# Patient Record
Sex: FEMALE | Race: WHITE | Employment: UNEMPLOYED | URBAN - METROPOLITAN AREA
[De-identification: names, ages, dates, MRNs, and addresses within clinical notes are randomized per-mention and may not be internally consistent; named-entity substitution may affect disease eponyms.]

---

## 2019-07-31 ENCOUNTER — OFFICE VISIT (OUTPATIENT)
Dept: HEMATOLOGY | Age: 54
End: 2019-07-31

## 2019-07-31 ENCOUNTER — HOSPITAL ENCOUNTER (OUTPATIENT)
Dept: LAB | Age: 54
Discharge: HOME | End: 2019-07-31
Payer: OTHER GOVERNMENT

## 2019-07-31 VITALS
OXYGEN SATURATION: 97 % | HEIGHT: 65 IN | TEMPERATURE: 98.5 F | WEIGHT: 150 LBS | DIASTOLIC BLOOD PRESSURE: 72 MMHG | HEART RATE: 80 BPM | BODY MASS INDEX: 24.99 KG/M2 | SYSTOLIC BLOOD PRESSURE: 126 MMHG

## 2019-07-31 DIAGNOSIS — R74.8 ELEVATED LIVER ENZYMES: ICD-10-CM

## 2019-07-31 DIAGNOSIS — R74.8 ELEVATED LIVER ENZYMES: Primary | ICD-10-CM

## 2019-07-31 LAB
ALBUMIN SERPL-MCNC: 3.7 G/DL (ref 3.4–5)
ALBUMIN/GLOB SERPL: 0.8 {RATIO} (ref 0.8–1.7)
ALP SERPL-CCNC: 349 U/L (ref 45–117)
ALT SERPL-CCNC: 115 U/L (ref 13–56)
ANION GAP SERPL CALC-SCNC: 8 MMOL/L (ref 3–18)
AST SERPL-CCNC: 73 U/L (ref 10–38)
BASOPHILS # BLD: 0 K/UL (ref 0–0.1)
BASOPHILS NFR BLD: 0 % (ref 0–2)
BILIRUB DIRECT SERPL-MCNC: <0.1 MG/DL (ref 0–0.2)
BILIRUB SERPL-MCNC: 0.2 MG/DL (ref 0.2–1)
BUN SERPL-MCNC: 10 MG/DL (ref 7–18)
BUN/CREAT SERPL: 19 (ref 12–20)
CALCIUM SERPL-MCNC: 9.4 MG/DL (ref 8.5–10.1)
CHLORIDE SERPL-SCNC: 108 MMOL/L (ref 100–111)
CO2 SERPL-SCNC: 25 MMOL/L (ref 21–32)
CREAT SERPL-MCNC: 0.54 MG/DL (ref 0.6–1.3)
DIFFERENTIAL METHOD BLD: ABNORMAL
EOSINOPHIL # BLD: 0.1 K/UL (ref 0–0.4)
EOSINOPHIL NFR BLD: 1 % (ref 0–5)
ERYTHROCYTE [DISTWIDTH] IN BLOOD BY AUTOMATED COUNT: 14.1 % (ref 11.6–14.5)
GLOBULIN SER CALC-MCNC: 4.4 G/DL (ref 2–4)
GLUCOSE SERPL-MCNC: 85 MG/DL (ref 74–99)
HCT VFR BLD AUTO: 34.4 % (ref 35–45)
HGB BLD-MCNC: 11.1 G/DL (ref 12–16)
INR PPP: 0.9 (ref 0.8–1.2)
LYMPHOCYTES # BLD: 1.9 K/UL (ref 0.9–3.6)
LYMPHOCYTES NFR BLD: 34 % (ref 21–52)
MCH RBC QN AUTO: 29.9 PG (ref 24–34)
MCHC RBC AUTO-ENTMCNC: 32.3 G/DL (ref 31–37)
MCV RBC AUTO: 92.7 FL (ref 74–97)
MONOCYTES # BLD: 0.8 K/UL (ref 0.05–1.2)
MONOCYTES NFR BLD: 14 % (ref 3–10)
NEUTS SEG # BLD: 2.8 K/UL (ref 1.8–8)
NEUTS SEG NFR BLD: 51 % (ref 40–73)
PLATELET # BLD AUTO: 344 K/UL (ref 135–420)
PMV BLD AUTO: 11 FL (ref 9.2–11.8)
POTASSIUM SERPL-SCNC: 4.2 MMOL/L (ref 3.5–5.5)
PROT SERPL-MCNC: 8.1 G/DL (ref 6.4–8.2)
PROTHROMBIN TIME: 11.8 SEC (ref 11.5–15.2)
RBC # BLD AUTO: 3.71 M/UL (ref 4.2–5.3)
SODIUM SERPL-SCNC: 141 MMOL/L (ref 136–145)
WBC # BLD AUTO: 5.5 K/UL (ref 4.6–13.2)

## 2019-07-31 PROCEDURE — 86038 ANTINUCLEAR ANTIBODIES: CPT

## 2019-07-31 PROCEDURE — 80076 HEPATIC FUNCTION PANEL: CPT

## 2019-07-31 PROCEDURE — 83516 IMMUNOASSAY NONANTIBODY: CPT

## 2019-07-31 PROCEDURE — 85025 COMPLETE CBC W/AUTO DIFF WBC: CPT

## 2019-07-31 PROCEDURE — 85610 PROTHROMBIN TIME: CPT

## 2019-07-31 PROCEDURE — 36415 COLL VENOUS BLD VENIPUNCTURE: CPT

## 2019-07-31 PROCEDURE — 86708 HEPATITIS A ANTIBODY: CPT

## 2019-07-31 PROCEDURE — 87340 HEPATITIS B SURFACE AG IA: CPT

## 2019-07-31 PROCEDURE — 86704 HEP B CORE ANTIBODY TOTAL: CPT

## 2019-07-31 PROCEDURE — 82103 ALPHA-1-ANTITRYPSIN TOTAL: CPT

## 2019-07-31 PROCEDURE — 82728 ASSAY OF FERRITIN: CPT

## 2019-07-31 PROCEDURE — 83540 ASSAY OF IRON: CPT

## 2019-07-31 PROCEDURE — 86706 HEP B SURFACE ANTIBODY: CPT

## 2019-07-31 PROCEDURE — 86256 FLUORESCENT ANTIBODY TITER: CPT

## 2019-07-31 PROCEDURE — 86803 HEPATITIS C AB TEST: CPT

## 2019-07-31 PROCEDURE — 80048 BASIC METABOLIC PNL TOTAL CA: CPT

## 2019-07-31 NOTE — Clinical Note
7/31/19 Patient: Jennyfer Mckeon YOB: 1965 Date of Visit: 7/31/2019 PATEL Tellez 
[facility] [address] [address] VIA Facsimile: [phone] Dear PATEL Tellez, Thank you for referring Ms. Jennyfer Mckeon to [address] [place] [address],[address] for evaluation. My notes for this consultation are attached. If you have questions, please do not hesitate to call me. I look forward to following your patient along with you. Sincerely, Joshua Quiñones MD

## 2019-07-31 NOTE — PROGRESS NOTES
[address] [facility], Jose GOODMAN Lezlie Ganja, MD Elie Hammond, PA-C Charissa Mayor, ACN-BC     April S Lion, [facility]   MARIA DEL CARMEN JuradoP-MARCUS Pittman, [facility]       Sharon Mcclelland [facility] [place] [address]    at [address] [place], [address] [facility]    [address] [facility] 22.    [phone]    FAX: [address] [place] [address], [address]    [phone]    FAX: [phone]       Patient Care Team:  Judit Lazaro as PCP - General (Physician Assistant)      Problem List  Date Reviewed: 7/31/2019          Codes Class Noted    Elevated liver enzymes ICD-10-CM: R74.8  ICD-9-CM: 790.5  7/31/2019              The clinicians listed above have asked me to see Chitra Holliday in consultation regarding elevated liver enzymes and its management. All medical records sent by the referring physicians were reviewed including imaging studies     The patient is a 48 y.o.  female who was found to have elevated liver transaminases and alkaline phosphatase in 2009. Serologic evaluation for markers of chronic liver disease has either not been performed or the results are not available to me. Ultrasound MRI of the liver was performed in 3/2019. The results of the imaging suggested chronic liver disease. An assessment of liver fibrosis with biopsy or elastography has not been performed. The patient has the following symptoms which are thought to be due to the liver disease:  fatigue,     The patient has not experienced the following symptoms:  pain in the right side over the liver,     The patient completes all daily activities without any functional limitations.       ASSESSMENT AND PLAN:  Elevated liver enzymes  Persistent elevation in liver transaminases and alkaline phosphatase of unclear etiology at this time. Liver function is normal.      Based upon laboratory studies and imaging  the patient may have advanced liver disease or cirrhosis. Serologic testing for causes of chronic liver disease were ordered. The most likely causes for the liver chemistry abnormalities were discussed with the patient and include   immune liver disorders,     Have performed laboratory testing to monitor liver function and degree of liver injury. This included BMP, hepatic panel, CBC with platelet count, INR. The need to assess liver fibrosis was discussed. Sheer wave elastography can assess liver fibrosis and determine if a patient has advanced fibrosis or cirrhosis without the need for liver biopsy. This will be scheduled. If the elastography suggests advanced fibrosis then a liver biopsy should be considered. Screening for Hepatocellular Carcinoma  HCC screening has recently been performed and does not suggest [address] The next liver imaging study will be performed in 9/2019. Treatment of other medical problems in patients with chronic liver disease  There are no contraindications for the patient to take most medications that are necessary for treatment of other medical issues. Counseling for alcohol in patients with chronic liver disease  The patient was counseled regarding alcohol consumption and the effect of alcohol on chronic liver disease. The patient does not consume any significant amount of alcohol. Vaccinations   The need for vaccination against viral hepatitis A and B will be assessed with serologic and instituted as appropriate. Routine vaccinations against other bacterial and viral agents can be performed as indicated. Annual flu vaccination should be administered if indicated.       ALLERGIES  Allergies   Allergen Reactions    Sulfa (Sulfonamide Antibiotics) Rash       MEDICATIONS  No current outpatient medications on file. No current facility-administered medications for this visit. SYSTEM REVIEW NOT RELATED TO LIVER DISEASE OR REVIEWED ABOVE:  Constitution systems: Negative for fever, chills, weight gain, weight loss. Eyes: Negative for visual changes. ENT: Negative for sore throat, painful swallowing. Respiratory: Negative for cough, hemoptysis, SOB. Cardiology: Negative for chest pain, palpitations. GI:  Negative for constipation or diarrhea. : Negative for urinary frequency, dysuria, hematuria, nocturia. Skin: Negative for rash. Hematology: Negative for easy bruising, blood clots. Musculo-skelatal: Negative for back pain, muscle pain, weakness. Neurologic: Negative for headaches, dizziness, vertigo, memory problems not related to HE. Psychology: Negative for anxiety, depression. FAMILY HISTORY:  The father   of unknown causes. The mother  from [facility]. SOCIAL HISTORY:  The patient is . The patient has 3 children, and 6 grandchildren. The patient has never used tobacco products. The patient has never consumed significant amounts of alcohol. The patient does not work outside the home. PHYSICAL EXAMINATION:  Visit Vitals  /72   Pulse 80   Temp 98.5 °F (36.9 °C) (Tympanic)   Ht 5' 5\" (1.651 m)   Wt 150 lb (68 kg)   SpO2 97%   BMI 24.96 kg/m²     General: No acute distress. Eyes: Sclera anicteric. ENT: No oral lesions. Thyroid normal.  Nodes: No adenopathy. Skin: Single spider angiomata on chest.    Respiratory: Lungs clear to auscultation. Cardiovascular: Regular heart rate. No murmurs. No JVD. Abdomen: Soft non-tender. Liver size normal to percussion/palpation. Spleen not palpable. No obvious ascites. Extremities: No edema. No muscle wasting. No gross arthritic changes. Neurologic: Alert and oriented. Cranial nerves grossly intact. No asterixis.     LABORATORY STUDIES:  From 2019  AST/ALT/ALP/T Bili/ALB:  98/118/280/0.2/4.2  BUN/CREAT:  11/0.47    SEROLOGIES:  Not available or performed. Testing was performed today. LIVER HISTOLOGY:  Not available or performed    ENDOSCOPIC PROCEDURES:  Not available or performed    RADIOLOGY:  2/2019. Ultrasound of liver. Echogenic consistent with cirrhosis. No liver mass lesions. No dilated bile ducts. No ascites. 3/2019. MRI scan abdomen. Changes consistent with cirrhosis. No liver mass lesions. No dilated bile ducts. No bile duct strictures. No ascites. OTHER TESTING:  Not available or performed    FOLLOW-UP:  All of the issues listed above in the Assessment and Plan were discussed with the patient. All questions were answered. The patient expressed a clear understanding of the above. [address] [address] in 4 weeks for elastography to review all data and determine the treatment plan.     Kelsey English MD  [address]  [address] [place] [address], [address] [place] [address] - [address]  [address] [place] [address]

## 2019-08-01 LAB
A1AT SERPL-MCNC: 125 MG/DL (ref 90–200)
C-ANCA TITR SER IF: NORMAL TITER
COMMENT, 144067: NORMAL
FERRITIN SERPL-MCNC: 23 NG/ML (ref 8–388)
HAV AB SER QL IA: NEGATIVE
HBV CORE AB SERPL QL IA: NEGATIVE
HBV SURFACE AB SER QL IA: POSITIVE
HBV SURFACE AB SERPL IA-ACNC: 21.14 MIU/ML
HBV SURFACE AG SER QL: <0.1 INDEX
HBV SURFACE AG SER QL: NEGATIVE
HCV AB S/CO SERPL IA: <0.1 S/CO RATIO (ref 0–0.9)
HEP BS AB COMMENT,HBSAC: NORMAL
IRON SATN MFR SERPL: 14 %
IRON SERPL-MCNC: 72 UG/DL (ref 50–175)
P-ANCA ATYPICAL TITR SER IF: NORMAL TITER
P-ANCA TITR SER IF: NORMAL TITER
TIBC SERPL-MCNC: 500 UG/DL (ref 250–450)

## 2019-08-02 LAB
ACTIN IGG SERPL-ACNC: 15 UNITS (ref 0–19)
ANA TITR SER IF: NEGATIVE {TITER}
MITOCHONDRIA M2 IGG SER-ACNC: 170.7 UNITS (ref 0–20)

## 2019-08-08 ENCOUNTER — HOSPITAL ENCOUNTER (OUTPATIENT)
Dept: ULTRASOUND IMAGING | Age: 54
Discharge: HOME | End: 2019-08-08
Attending: INTERNAL MEDICINE
Payer: OTHER GOVERNMENT

## 2019-08-08 DIAGNOSIS — R74.8 ELEVATED LIVER ENZYMES: ICD-10-CM

## 2019-08-08 PROCEDURE — 76981 USE PARENCHYMA: CPT

## 2019-08-14 ENCOUNTER — OFFICE VISIT (OUTPATIENT)
Dept: HEMATOLOGY | Age: 54
End: 2019-08-14

## 2019-08-14 VITALS
RESPIRATION RATE: 18 BRPM | OXYGEN SATURATION: 98 % | DIASTOLIC BLOOD PRESSURE: 83 MMHG | WEIGHT: 151 LBS | SYSTOLIC BLOOD PRESSURE: 113 MMHG | BODY MASS INDEX: 25.16 KG/M2 | TEMPERATURE: 98.6 F | HEIGHT: 65 IN | HEART RATE: 71 BPM

## 2019-08-14 DIAGNOSIS — K74.3 PRIMARY BILIARY CHOLANGITIS (HCC): Primary | ICD-10-CM

## 2019-08-14 RX ORDER — URSODIOL 500 MG/1
500 TABLET, FILM COATED ORAL 2 TIMES DAILY
Qty: 180 TAB | Refills: 4 | Status: SHIPPED | OUTPATIENT
Start: 2019-08-14 | End: 2020-05-07 | Stop reason: SDUPTHER

## 2019-08-14 NOTE — LETTER
8/14/19 Patient: Waqar Ruelas YOB: 1965 Date of Visit: 8/14/2019 PATEL James 
[facility] [address] [address] VIA Facsimile: [phone] Dear PATEL James, Thank you for referring Ms. Waqar Ruelas to [address] for evaluation. My notes for this consultation are attached. If you have questions, please do not hesitate to call me. I look forward to following your patient along with you. Sincerely, Laurel Rea MD

## 2019-08-14 NOTE — PROGRESS NOTES
Amanda Barber is a 48 y.o. female      1. Have you been to the ER, urgent care clinic or hospitalized since your last visit? NO.     2. Have you seen or consulted any other health care providers outside of the [address] [place] [address] since your last visit (Include any pap smears or colon screening)?  NO            Learning Assessment 8/14/2019   PRIMARY LEARNER Patient   BARRIERS PRIMARY LEARNER NONE   CO-LEARNER CAREGIVER No   PRIMARY LANGUAGE ENGLISH   LEARNER PREFERENCE PRIMARY LISTENING   ANSWERED BY PATIENT   RELATIONSHIP SELF

## 2019-08-14 NOTE — PROGRESS NOTES
[address] [facility], MD, [address], Cite Bright Pelayo, MD Bello Pradhan PA-C Hildreth Elder, [facility]-BC     [place] VARGHESE Lion [facility]   HAYLEE Melo, [facility]       [facility] [place] [address]    at [address], [address] Lynnette Almanza  22.    [phone]    FAX: [address] [place] [address], [address]    [phone]    FAX: [phone]       Patient Care Team:  Merlyn Hall as PCP - General (Physician Assistant)      Problem List  Date Reviewed: 7/31/2019          Codes Class Noted    Elevated liver enzymes ICD-10-CM: R74.8  ICD-9-CM: 790.5  7/31/2019              Gama Berry returns to the [address] for management of Primary Biliary Cholangitis. The active problem list, all pertinent past medical history, medications, radiologic findings and laboratory findings related to the liver disorder were reviewed with the patient. The patient is a 48 y.o.  female who was found to have elevated liver transaminases and alkaline phosphatase in 2009. Serologic evaluation for markers of chronic liver disease was positive for AMA. Ultrasound and MRI of the liver was performed in 3/2019. The results of the imaging suggested chronic liver disease and possible cirrhosis. Assessment of liver fibrosis was performed with sheer wave elastogrphy at [facility].  in 8/2019. The result was not calculated in kPa and suggested stage 2-3 fibrosis.     The patient has the following symptoms which are thought to be due to the liver disease:  fatigue,     The patient has not experienced the following symptoms:  pain in the right side over the liver,     The patient completes all daily activities without any functional limitations. ASSESSMENT AND PLAN:  Primary Biliary Cholangitis  The diagnosis is based upon serology and an elevation in ALP. A liver biopsy has not been performed. Assessment of liver fibrosis was performed with sheer wave elastogrphy in 8/2019. The result was not calculated in kPa which correlates with stage 2-3 fibrosis    Liver transaminases are elevated. ALP is elevated. Liver function is normal.  The platelet count is normal.    Based upon laboratory studies Elastography, and imaging the patient may have advanced liver disease. The patient will be started on DEANNA at a dose of 1000 mg every day. The side effects of DEANNA including a 2% risk of itching and a 2% risk of diarrhea were discussed. The need to perform an assessment of liver fibrosis was discussed with the patient. The Fibroscan can assess liver fibrosis and determine if a patient has advanced fibrosis or cirrhosis without the need for liver biopsy. This will be performed at the next office visit. If the Fibroscan suggests advanced fibrosis then a liver biopsy should be considered. Treatment of other medical problems in patients with chronic liver disease  There are no contraindications for the patient to take most medications that are necessary for treatment of other medical issues. Counseling for alcohol in patients with chronic liver disease  The patient was counseled regarding alcohol consumption and the effect of alcohol on chronic liver disease. The patient does not consume any significant amount of alcohol. Vaccinations   The need for vaccination against viral hepatitis A and B will be assessed with serologic and instituted as appropriate. Routine vaccinations against other bacterial and viral agents can be performed as indicated. Annual flu vaccination should be administered if indicated.       ALLERGIES  Allergies   Allergen Reactions    Sulfa (Sulfonamide Antibiotics) Rash       MEDICATIONS  No current outpatient medications on file. No current facility-administered medications for this visit. SYSTEM REVIEW NOT RELATED TO LIVER DISEASE OR REVIEWED ABOVE:  Constitution systems: Negative for fever, chills, weight gain, weight loss. Eyes: Negative for visual changes. ENT: Negative for sore throat, painful swallowing. Respiratory: Negative for cough, hemoptysis, SOB. Cardiology: Negative for chest pain, palpitations. GI:  Negative for constipation or diarrhea. : Negative for urinary frequency, dysuria, hematuria, nocturia. Skin: Negative for rash. Hematology: Negative for easy bruising, blood clots. Musculo-skelatal: Negative for back pain, muscle pain, weakness. Neurologic: Negative for headaches, dizziness, vertigo, memory problems not related to HE. Psychology: Negative for anxiety, depression. FAMILY HISTORY:  The father   of unknown causes. The mother  from [facility]. SOCIAL HISTORY:  The patient is . The patient has 3 children, and 6 grandchildren. The patient has never used tobacco products. The patient has never consumed significant amounts of alcohol. The patient does not work outside the home. PHYSICAL EXAMINATION:  Visit Vitals  /83 (BP 1 Location: Right arm, BP Patient Position: Sitting)   Pulse 71   Temp 98.6 °F (37 °C) (Tympanic)   Resp 18   Ht 5' 5\" (1.651 m)   Wt 151 lb (68.5 kg)   SpO2 98%   BMI 25.13 kg/m²     General: No acute distress. Eyes: Sclera anicteric. ENT: No oral lesions. Thyroid normal.  Nodes: No adenopathy. Skin: Single spider angiomata on chest.    Respiratory: Lungs clear to auscultation. Cardiovascular: Regular heart rate. No murmurs. No JVD. Abdomen: Soft non-tender. Liver size normal to percussion/palpation. Spleen not palpable. No obvious ascites. Extremities: No edema. No muscle wasting.   No gross arthritic changes. Neurologic: Alert and oriented. Cranial nerves grossly intact. No asterixis. LABORATORY STUDIES:  Liver [place] [address] Units 7/31/2019   WBC 4.6 - 13.2 K/uL 5.5   ANC 1.8 - 8.0 K/UL 2.8   HGB 12.0 - 16.0 g/dL 11.1 (L)    - 420 K/uL 344   INR 0.8 - 1.2   0.9   AST 10 - 38 U/L 73 (H)   ALT 13 - 56 U/L 115 (H)   Alk Phos 45 - 117 U/L 349 (H)   Bili, Total 0.2 - 1.0 MG/DL 0.2   Bili, Direct 0.0 - 0.2 MG/DL <0.1   Albumin 3.4 - 5.0 g/dL 3.7   BUN 7.0 - 18 MG/DL 10   Creat 0.6 - 1.3 MG/DL 0.54 (L)   Na 136 - 145 mmol/L 141   K 3.5 - 5.5 mmol/L 4.2   Cl 100 - 111 mmol/L 108   CO2 21 - 32 mmol/L 25   Glucose 74 - 99 mg/dL 85     SEROLOGIES:  Serologies Latest Ref Rng & Units 7/31/2019   Hep A Ab, Total NEGATIVE   NEGATIVE   Hep B Surface Ag <1.00 Index <0.10   Hep B Surface Ag Interp NEG   NEGATIVE   Hep B Core Ab, Total NEGATIVE   NEGATIVE   Hep B Surface Ab >10.0 mIU/mL 21.14   Hep B Surface Ab Interp POS   POSITIVE   Hep C Ab 0.0 - 0.9 s/co ratio <0.1   Ferritin 8 - 388 NG/ML 23   Iron % Saturation % 14   JOSAFAT, IFA  NEGATIVE   C-ANCA Neg:<1:20 titer <1:20   P-ANCA Neg:<1:20 titer <1:20   ANCA Neg:<1:20 titer <1:20   ASMCA 0 - 19 Units 15   M2 Ab 0.0 - 20.0 Units 170.7 (H)   Alpha-1 antitrypsin level 90 - 200 mg/dL 125     LIVER HISTOLOGY:  8/2019. Sheer wave elastography performed at [facility].  1.7 m/sec. The results suggested a fibrosis level of F2-3. ENDOSCOPIC PROCEDURES:  Not available or performed    RADIOLOGY:  2/2019. Ultrasound of liver. Echogenic consistent with cirrhosis. No liver mass lesions. No dilated bile ducts. No ascites. 3/2019. MRI scan abdomen. Changes consistent with cirrhosis. No liver mass lesions. No dilated bile ducts. No bile duct strictures. No ascites. OTHER TESTING:  Not available or performed    FOLLOW-UP:  All of the issues listed above in the Assessment and Plan were discussed with the patient.   All questions were answered. The patient expressed a clear understanding of the above. [facility] 87 in 4 weeks for Fibroscan to assess response to DEANNA.         Karla Moran MD  [address]  4 [facility], [address] [place] [address] Amanda Leahy, [address]  12 [facility]

## 2019-09-10 ENCOUNTER — OFFICE VISIT (OUTPATIENT)
Dept: HEMATOLOGY | Age: 54
End: 2019-09-10

## 2019-09-10 ENCOUNTER — HOSPITAL ENCOUNTER (OUTPATIENT)
Dept: LAB | Age: 54
Discharge: HOME | End: 2019-09-10
Payer: OTHER GOVERNMENT

## 2019-09-10 VITALS
HEART RATE: 67 BPM | DIASTOLIC BLOOD PRESSURE: 73 MMHG | SYSTOLIC BLOOD PRESSURE: 109 MMHG | HEIGHT: 65 IN | WEIGHT: 150 LBS | BODY MASS INDEX: 24.99 KG/M2 | RESPIRATION RATE: 16 BRPM | TEMPERATURE: 97.7 F | OXYGEN SATURATION: 96 %

## 2019-09-10 DIAGNOSIS — K74.3 PRIMARY BILIARY CHOLANGITIS (HCC): ICD-10-CM

## 2019-09-10 DIAGNOSIS — K74.3 PRIMARY BILIARY CHOLANGITIS (HCC): Primary | ICD-10-CM

## 2019-09-10 LAB
ALBUMIN SERPL-MCNC: 3.8 G/DL (ref 3.4–5)
ALBUMIN/GLOB SERPL: 0.9 {RATIO} (ref 0.8–1.7)
ALP SERPL-CCNC: 243 U/L (ref 45–117)
ALT SERPL-CCNC: 74 U/L (ref 13–56)
ANION GAP SERPL CALC-SCNC: 8 MMOL/L (ref 3–18)
AST SERPL-CCNC: 43 U/L (ref 10–38)
BASOPHILS # BLD: 0 K/UL (ref 0–0.1)
BASOPHILS NFR BLD: 0 % (ref 0–2)
BILIRUB DIRECT SERPL-MCNC: <0.1 MG/DL (ref 0–0.2)
BILIRUB SERPL-MCNC: 0.3 MG/DL (ref 0.2–1)
BUN SERPL-MCNC: 12 MG/DL (ref 7–18)
BUN/CREAT SERPL: 24 (ref 12–20)
CALCIUM SERPL-MCNC: 9.7 MG/DL (ref 8.5–10.1)
CHLORIDE SERPL-SCNC: 105 MMOL/L (ref 100–111)
CO2 SERPL-SCNC: 27 MMOL/L (ref 21–32)
CREAT SERPL-MCNC: 0.51 MG/DL (ref 0.6–1.3)
DIFFERENTIAL METHOD BLD: ABNORMAL
EOSINOPHIL # BLD: 0 K/UL (ref 0–0.4)
EOSINOPHIL NFR BLD: 1 % (ref 0–5)
ERYTHROCYTE [DISTWIDTH] IN BLOOD BY AUTOMATED COUNT: 13.9 % (ref 11.6–14.5)
GLOBULIN SER CALC-MCNC: 4.2 G/DL (ref 2–4)
GLUCOSE SERPL-MCNC: 79 MG/DL (ref 74–99)
HCT VFR BLD AUTO: 34.6 % (ref 35–45)
HGB BLD-MCNC: 11.1 G/DL (ref 12–16)
LYMPHOCYTES # BLD: 2.4 K/UL (ref 0.9–3.6)
LYMPHOCYTES NFR BLD: 41 % (ref 21–52)
MCH RBC QN AUTO: 29.8 PG (ref 24–34)
MCHC RBC AUTO-ENTMCNC: 32.1 G/DL (ref 31–37)
MCV RBC AUTO: 93 FL (ref 74–97)
MONOCYTES # BLD: 0.7 K/UL (ref 0.05–1.2)
MONOCYTES NFR BLD: 13 % (ref 3–10)
NEUTS SEG # BLD: 2.6 K/UL (ref 1.8–8)
NEUTS SEG NFR BLD: 45 % (ref 40–73)
PLATELET # BLD AUTO: 311 K/UL (ref 135–420)
PMV BLD AUTO: 10.5 FL (ref 9.2–11.8)
POTASSIUM SERPL-SCNC: 4.3 MMOL/L (ref 3.5–5.5)
PROT SERPL-MCNC: 8 G/DL (ref 6.4–8.2)
RBC # BLD AUTO: 3.72 M/UL (ref 4.2–5.3)
SODIUM SERPL-SCNC: 140 MMOL/L (ref 136–145)
WBC # BLD AUTO: 5.7 K/UL (ref 4.6–13.2)

## 2019-09-10 PROCEDURE — 36415 COLL VENOUS BLD VENIPUNCTURE: CPT

## 2019-09-10 PROCEDURE — 80048 BASIC METABOLIC PNL TOTAL CA: CPT

## 2019-09-10 PROCEDURE — 80076 HEPATIC FUNCTION PANEL: CPT

## 2019-09-10 PROCEDURE — 85025 COMPLETE CBC W/AUTO DIFF WBC: CPT

## 2019-09-10 NOTE — PROGRESS NOTES
[address] [facility], Aamir GOODMAN, Skye Hirsch MD Gloriann Bolls, NIVIA Alcantar, [facility]     April HALIMA TorresLion, [facility]   Lalito Figueredoland, FNP-C    Lanettegary Kishan, [facility]       Sharon Jesusutado [address]al [address]    at [address], [facility] Lynnette Almanza  22.    [phone]    FAX: [address] [place] [address], [address]    [phone]    FAX: [phone]       Patient Care Team:  Robert Gonzalez as PCP - General (Physician Assistant)      Problem List  Date Reviewed: 7/31/2019          Codes Class Noted    Primary biliary cholangitis [facility]) ICD-10-CM: K74.3  ICD-9-CM: 571.6  9/11/2019        Elevated liver enzymes ICD-10-CM: R74.8  ICD-9-CM: 790.5  7/31/2019            Collette Fountain returns to the [address] for management of Primary Biliary Cholangitis. The active problem list, all pertinent past medical history, medications, liver histology, radiologic findings, and laboratory findings related to the liver disorder were reviewed with the patient. The patient is a 48 y.o.  female who was found to have elevated liver transaminases and alkaline phosphatase in 2009. Serologic evaluation for markers of chronic liver disease was positive for AMA. The most recent imaging of the liver was Ultrasound performed in 8/2019. Results suggest chronic liver disease. Contour is notable for slight lobulation but there is no definite nodularity. Assessment of liver fibrosis was performed with shear wave elastography at [facility] in 8/2019. The result was mean liver stiffness value = 1.70 (+/- 0.05) m/s which correlates with septal fibrosis.     The patient has the following symptoms which are thought to be due to the liver disease: fatigue    The patient has not experienced the following symptoms:  pain in the right side over the liver    The patient completes all daily activities without any functional limitations. ASSESSMENT AND PLAN:  Primary Biliary Cholangitis  The diagnosis is based upon serology and an elevation in ALP. A liver biopsy has not been performed. Assessment of liver fibrosis was performed with shear wave elastogrphy in 8/2019. The result was mean liver stiffness value = 1.70 (+/- 0.05) m/s which correlates with stage 2 septal fibrosis    Liver transaminases are elevated. ALP is elevated. Liver function is normal. The platelet count is normal.      Based upon laboratory studies, Elastography, and imaging  the patient may have advanced liver disease. The patient was started on DEANNA at a dose of 1000 mg every day in 8/2019. The side effects of DEANNA including a 2% risk of itching and a 2% risk of diarrhea were discussed. The patient is tolerating the treatment well without any side effects. The patient is responding to treatment. The ALP is improving. Clinical Trials  The patient is not interested in participating in clinical trials for medications. She is however willing to participate in any trials for screening of progression. Screening for Hepatocellular Carcinoma  HCC screening has recently been performed and does not suggest [facility] [address] The next liver imaging study will be performed in 2/2020. Treatment of other medical problems in patients with chronic liver disease  There are no contraindications for the patient to take most medications that are necessary for treatment of other medical issues. Counseling for alcohol in patients with chronic liver disease  The patient was counseled regarding alcohol consumption and the effect of alcohol on chronic liver disease. The patient does not consume any significant amount of alcohol.     Vaccinations   Vaccination for viral hepatitis A is recommended since the patient has no serologic evidence of previous exposure or vaccination with immunity. Vaccination for viral hepatitis B is not needed. The patient has serologic evidence of prior exposure or vaccination with immunity. Routine vaccinations against other bacterial and viral agents can be performed as indicated. Annual flu vaccination should be administered if indicated. ALLERGIES  Allergies   Allergen Reactions    Sulfa (Sulfonamide Antibiotics) Rash       MEDICATIONS  Current Outpatient Medications   Medication Sig    ursodiol (DEANNA FORTE) 500 mg tablet Take 1 Tab by mouth two (2) times a day. No current facility-administered medications for this visit. SYSTEM REVIEW NOT RELATED TO LIVER DISEASE OR REVIEWED ABOVE:  Constitution systems: Negative for fever, chills, weight gain, weight loss. Eyes: Negative for visual changes. ENT: Negative for sore throat, painful swallowing. Respiratory: Negative for cough, hemoptysis, SOB. Cardiology: Negative for chest pain, palpitations. GI:  Negative for constipation or diarrhea. : Negative for urinary frequency, dysuria, hematuria, nocturia. Skin: Negative for rash. Hematology: Negative for easy bruising, blood clots. Musculo-skelatal: Negative for back pain, muscle pain, weakness. Neurologic: Negative for headaches, dizziness, vertigo, memory problems not related to HE. Psychology: Negative for anxiety, depression. FAMILY HISTORY:  The father  of unknown causes. The mother  from [facility]. SOCIAL HISTORY:  The patient is . The patient has 3 children, and 6 grandchildren. The patient has never used tobacco products. The patient has never consumed significant amounts of alcohol. The patient does not work outside the home.         PHYSICAL EXAMINATION:  Visit Vitals  /73 (BP 1 Location: Right arm, BP Patient Position: Sitting)   Pulse 67   Temp 97.7 °F (36.5 °C) (Tympanic)   Resp 16   Ht 5' 5\" (1.651 m)   Wt 150 lb (68 kg)   SpO2 96%   BMI 24.96 kg/m²     General: No acute distress. Eyes: Sclera anicteric. ENT: No oral lesions. Thyroid normal.  Nodes: No adenopathy. Skin: Single spider angiomata on chest.    Respiratory: Lungs clear to auscultation. Cardiovascular: Regular heart rate. No murmurs. No JVD. Abdomen: Soft non-tender. Liver size normal to percussion/palpation. Spleen not palpable. No obvious ascites. Extremities: No edema. No muscle wasting. No gross arthritic changes. Neurologic: Alert and oriented. Cranial nerves grossly intact. No asterixis. LABORATORY STUDIES:  Liver [place] [address] Units 9/10/2019 7/31/2019   WBC 4.6 - 13.2 K/uL 5.7 5.5   ANC 1.8 - 8.0 K/UL 2.6 2.8   HGB 12.0 - 16.0 g/dL 11.1 (L) 11.1 (L)    - 420 K/uL 311 344   INR 0.8 - 1.2    0.9   AST 10 - 38 U/L 43 (H) 73 (H)   ALT 13 - 56 U/L 74 (H) 115 (H)   Alk Phos 45 - 117 U/L 243 (H) 349 (H)   Bili, Total 0.2 - 1.0 MG/DL 0.3 0.2   Bili, Direct 0.0 - 0.2 MG/DL <0.1 <0.1   Albumin 3.4 - 5.0 g/dL 3.8 3.7   BUN 7.0 - 18 MG/DL 12 10   Creat 0.6 - 1.3 MG/DL 0.51 (L) 0.54 (L)   Na 136 - 145 mmol/L 140 141   K 3.5 - 5.5 mmol/L 4.3 4.2   Cl 100 - 111 mmol/L 105 108   CO2 21 - 32 mmol/L 27 25   Glucose 74 - 99 mg/dL 79 85     SEROLOGIES:  Serologies Latest Ref Rng & Units 7/31/2019   Hep A Ab, Total NEGATIVE   NEGATIVE   Hep B Surface Ag <1.00 Index <0.10   Hep B Surface Ag Interp NEG   NEGATIVE   Hep B Core Ab, Total NEGATIVE   NEGATIVE   Hep B Surface Ab >10.0 mIU/mL 21.14   Hep B Surface Ab Interp POS   POSITIVE   Hep C Ab 0.0 - 0.9 s/co ratio <0.1   Ferritin 8 - 388 NG/ML 23   Iron % Saturation % 14   JOSAFAT, IFA  NEGATIVE   C-ANCA Neg:<1:20 titer <1:20   P-ANCA Neg:<1:20 titer <1:20   ANCA Neg:<1:20 titer <1:20   ASMCA 0 - 19 Units 15   M2 Ab 0.0 - 20.0 Units 170.7 (H)   Alpha-1 antitrypsin level 90 - 200 mg/dL 125     LIVER HISTOLOGY:  8/2019. Shear wave elastography performed at [facility]. Mean liver stiffness value = 1.70 (+/- 0.05) m/s. The results suggested a fibrosis level of F2.    ENDOSCOPIC PROCEDURES:  Not available or performed    RADIOLOGY:  2/2019. Ultrasound of liver. Echogenic consistent with cirrhosis. No liver mass lesions. No dilated bile ducts. No ascites. 3/2019. MRI scan abdomen. Changes consistent with cirrhosis. No liver mass lesions. No dilated bile ducts. No bile duct strictures. No ascites. 8/2019. Ultrasound of liver. Echogenic consistent with cirrhosis. No liver mass lesions. No dilated bile ducts. No ascites. OTHER TESTING:  Not available or performed    FOLLOW-UP:  All of the issues listed above in the Assessment and Plan were discussed with the patient. All questions were answered. The patient expressed a clear understanding of the above. [address] [address] in 3 months for routine monitoring.       Latisha Clubs, [facility] State HospitalNP-BC  [address] Liver [place] [address] [address]  [facility], [address] [place] [address] Street [address]  [phone]

## 2019-09-10 NOTE — PROGRESS NOTES
Brandon Motta is a 48 y.o. female      1. Have you been to the ER, urgent care clinic or hospitalized since your last visit? NO.     2. Have you seen or consulted any other health care providers outside of the [address] [place] [address] since your last visit (Include any pap smears or colon screening)?  NO          Learning Assessment 8/14/2019   PRIMARY LEARNER Patient   BARRIERS PRIMARY LEARNER NONE   CO-LEARNER CAREGIVER No   PRIMARY LANGUAGE ENGLISH   LEARNER PREFERENCE PRIMARY LISTENING   ANSWERED BY PATIENT   RELATIONSHIP SELF

## 2019-09-10 NOTE — PROGRESS NOTES
Liver enzymes still elevated however have improved. Alk phos down to 243 (was 349). Continue DEANNA and we will recheck at next visit.

## 2019-09-11 PROBLEM — K74.3 PRIMARY BILIARY CHOLANGITIS (HCC): Status: ACTIVE | Noted: 2019-09-11

## 2019-12-16 ENCOUNTER — OFFICE VISIT (OUTPATIENT)
Dept: HEMATOLOGY | Age: 54
End: 2019-12-16

## 2019-12-16 ENCOUNTER — HOSPITAL ENCOUNTER (OUTPATIENT)
Dept: LAB | Age: 54
Discharge: HOME | End: 2019-12-16
Payer: OTHER GOVERNMENT

## 2019-12-16 VITALS
TEMPERATURE: 96.9 F | DIASTOLIC BLOOD PRESSURE: 81 MMHG | HEIGHT: 65 IN | OXYGEN SATURATION: 98 % | WEIGHT: 148 LBS | BODY MASS INDEX: 24.66 KG/M2 | SYSTOLIC BLOOD PRESSURE: 133 MMHG | HEART RATE: 73 BPM

## 2019-12-16 DIAGNOSIS — K74.3 PRIMARY BILIARY CHOLANGITIS (HCC): Primary | ICD-10-CM

## 2019-12-16 DIAGNOSIS — K74.3 PRIMARY BILIARY CHOLANGITIS (HCC): ICD-10-CM

## 2019-12-16 LAB
ALBUMIN SERPL-MCNC: 3.7 G/DL (ref 3.4–5)
ALBUMIN/GLOB SERPL: 0.9 {RATIO} (ref 0.8–1.7)
ALP SERPL-CCNC: 205 U/L (ref 45–117)
ALT SERPL-CCNC: 53 U/L (ref 13–56)
ANION GAP SERPL CALC-SCNC: 5 MMOL/L (ref 3–18)
AST SERPL-CCNC: 37 U/L (ref 10–38)
BASOPHILS # BLD: 0 K/UL (ref 0–0.1)
BASOPHILS NFR BLD: 0 % (ref 0–2)
BILIRUB DIRECT SERPL-MCNC: <0.1 MG/DL (ref 0–0.2)
BILIRUB SERPL-MCNC: 0.2 MG/DL (ref 0.2–1)
BUN SERPL-MCNC: 9 MG/DL (ref 7–18)
BUN/CREAT SERPL: 18 (ref 12–20)
CALCIUM SERPL-MCNC: 9.3 MG/DL (ref 8.5–10.1)
CHLORIDE SERPL-SCNC: 105 MMOL/L (ref 100–111)
CO2 SERPL-SCNC: 29 MMOL/L (ref 21–32)
CREAT SERPL-MCNC: 0.5 MG/DL (ref 0.6–1.3)
DIFFERENTIAL METHOD BLD: ABNORMAL
EOSINOPHIL # BLD: 0 K/UL (ref 0–0.4)
EOSINOPHIL NFR BLD: 1 % (ref 0–5)
ERYTHROCYTE [DISTWIDTH] IN BLOOD BY AUTOMATED COUNT: 13.4 % (ref 11.6–14.5)
GLOBULIN SER CALC-MCNC: 3.9 G/DL (ref 2–4)
GLUCOSE SERPL-MCNC: 68 MG/DL (ref 74–99)
HCT VFR BLD AUTO: 35.2 % (ref 35–45)
HGB BLD-MCNC: 11.2 G/DL (ref 12–16)
LYMPHOCYTES # BLD: 1.9 K/UL (ref 0.9–3.6)
LYMPHOCYTES NFR BLD: 41 % (ref 21–52)
MCH RBC QN AUTO: 30.4 PG (ref 24–34)
MCHC RBC AUTO-ENTMCNC: 31.8 G/DL (ref 31–37)
MCV RBC AUTO: 95.4 FL (ref 74–97)
MONOCYTES # BLD: 0.6 K/UL (ref 0.05–1.2)
MONOCYTES NFR BLD: 13 % (ref 3–10)
NEUTS SEG # BLD: 2.1 K/UL (ref 1.8–8)
NEUTS SEG NFR BLD: 45 % (ref 40–73)
PLATELET # BLD AUTO: 338 K/UL (ref 135–420)
PMV BLD AUTO: 10.9 FL (ref 9.2–11.8)
POTASSIUM SERPL-SCNC: 3.8 MMOL/L (ref 3.5–5.5)
PROT SERPL-MCNC: 7.6 G/DL (ref 6.4–8.2)
RBC # BLD AUTO: 3.69 M/UL (ref 4.2–5.3)
SODIUM SERPL-SCNC: 139 MMOL/L (ref 136–145)
WBC # BLD AUTO: 4.6 K/UL (ref 4.6–13.2)

## 2019-12-16 PROCEDURE — 85025 COMPLETE CBC W/AUTO DIFF WBC: CPT

## 2019-12-16 PROCEDURE — 36415 COLL VENOUS BLD VENIPUNCTURE: CPT

## 2019-12-16 PROCEDURE — 80076 HEPATIC FUNCTION PANEL: CPT

## 2019-12-16 PROCEDURE — 80048 BASIC METABOLIC PNL TOTAL CA: CPT

## 2019-12-16 NOTE — PROGRESS NOTES
[address] [facility], MD, [address], [address] Bright Pelayo, MD Kendrick Valdez, NIVIA Dove, [facility]-BC     Quita Seymour, [facility]   Teodoro Cho P-C    Maira Anaya, [facility]       Sharonmihai Mcclelland Mikal [address]    at [address], [facility] Lynnette Almanza  22.    [phone]    FAX: [address] [place] [address], [address]    [phone]    FAX: [phone]       Patient Care Team:  Ivette Benavidez as PCP - General (Physician Assistant)  Ivette Benavidez (Physician Assistant)      Problem List  Date Reviewed: 12/16/2019          Codes Class Noted    Primary biliary cholangitis [facility]) ICD-10-CM: K74.3  ICD-9-CM: 571.6  9/11/2019              Le Jara returns to the [facility] for management of Primary Biliary Cholangitis. The active problem list, all pertinent past medical history, medications, liver histology, radiologic findings, and laboratory findings related to the liver disorder were reviewed with the patient. The patient is a 47 y.o.  female who was found to have elevated liver transaminases and alkaline phosphatase in 2009. Serologic evaluation for markers of chronic liver disease was positive for AMA. The most recent imaging of the liver was Ultrasound performed in 8/2019. Results suggest chronic liver disease. Contour is notable for slight lobulation but there is no definite nodularity. Assessment of liver fibrosis was performed with shear wave elastography at [facility] in 8/2019. The result was mean liver stiffness value = 1.70 (+/- 0.05) m/s which correlates with septal fibrosis. Fibroscan was performed at today's visit.  The result was E kPa of 10.9 which correlates with F2 septal fibrosis. The patient has the following symptoms which are thought to be due to the liver disease: fatigue    The patient has not experienced the following symptoms: pain in the right side over the liver, itching    The patient completes all daily activities without any functional limitations. ASSESSMENT AND PLAN:  Primary Biliary Cholangitis  The diagnosis is based upon serology and an elevation in ALP. A liver biopsy has not been performed. Assessment of liver fibrosis was performed with shear wave elastogrphy in 8/2019. The result was mean liver stiffness value = 1.70 (+/- 0.05) m/s which correlates with stage 2 septal fibrosis    Liver transaminases are elevated. ALP is elevated. Liver function is normal. The platelet count is normal.      Based upon laboratory studies, Elastography, and imaging  the patient does not appear to have advanced liver disease. The patient was started on DEANNA at a dose of 1000 mg every day in 8/2019. The side effects of DEANNA including a 2% risk of itching and a 2% risk of diarrhea were discussed. The patient is tolerating the treatment well without any side effects. The patient is responding to treatment. The ALP is improving. Clinical Trials  The patient is not interested in participating in clinical trials for medications. She is however willing to participate in any trials for screening of progression. Screening for Hepatocellular Carcinoma  HCC screening has recently been performed and does not suggest [address] The next liver imaging study will be performed in 2/2020. The data regarding the severity of fibrosis vs cirrhosis is conflicting. To be on the safe side will continue to screen for [address] every 6 months.     Treatment of other medical problems in patients with chronic liver disease  There are no contraindications for the patient to take most medications that are necessary for treatment of other medical issues. Counseling for alcohol in patients with chronic liver disease  The patient was counseled regarding alcohol consumption and the effect of alcohol on chronic liver disease. The patient does not consume any significant amount of alcohol. Vaccinations   Vaccination for viral hepatitis A is recommended since the patient has no serologic evidence of previous exposure or vaccination with immunity. Vaccination for viral hepatitis B is not needed. The patient has serologic evidence of prior exposure or vaccination with immunity. Routine vaccinations against other bacterial and viral agents can be performed as indicated. Annual flu vaccination should be administered if indicated. ALLERGIES  Allergies   Allergen Reactions    Sulfa (Sulfonamide Antibiotics) Rash       MEDICATIONS  Current Outpatient Medications   Medication Sig    ursodiol (DEANNA FORTE) 500 mg tablet Take 1 Tab by mouth two (2) times a day. No current facility-administered medications for this visit. SYSTEM REVIEW NOT RELATED TO LIVER DISEASE OR REVIEWED ABOVE:  Constitution systems: Negative for fever, chills, weight gain, weight loss. Eyes: Negative for visual changes. ENT: Negative for sore throat, painful swallowing. Respiratory: Negative for cough, hemoptysis, SOB. Cardiology: Negative for chest pain, palpitations. GI:  Negative for constipation or diarrhea. : Negative for urinary frequency, dysuria, hematuria, nocturia. Skin: Negative for rash. Hematology: Negative for easy bruising, blood clots. Musculo-skelatal: Negative for back pain, muscle pain, weakness. Neurologic: Negative for headaches, dizziness, vertigo, memory problems not related to HE. Psychology: Negative for anxiety, depression. FAMILY HISTORY:  The father  of unknown causes. The mother  from [facility]. SOCIAL HISTORY:  The patient is . The patient has 3 children, and 6 grandchildren.     The patient has never used tobacco products. The patient has never consumed significant amounts of alcohol. The patient does not work outside the home. PHYSICAL EXAMINATION:  Visit Vitals  /81   Pulse 73   Temp 96.9 °F (36.1 °C) (Tympanic)   Ht 5' 5\" (1.651 m)   Wt 148 lb (67.1 kg)   SpO2 98%   BMI 24.63 kg/m²     General: No acute distress. Eyes: Sclera anicteric. ENT: No oral lesions. Thyroid normal.  Nodes: No adenopathy. Skin: Single spider angiomata on chest.    Respiratory: Lungs clear to auscultation. Cardiovascular: Regular heart rate. No murmurs. No JVD. Abdomen: Soft non-tender. Liver size normal to percussion/palpation. Spleen not palpable. No obvious ascites. Extremities: No edema. No muscle wasting. No gross arthritic changes. Neurologic: Alert and oriented. Cranial nerves grossly intact. No asterixis.     LABORATORY STUDIES:  Liver [place] of [address] Units 9/10/2019 7/31/2019   WBC 4.6 - 13.2 K/uL 5.7 5.5   ANC 1.8 - 8.0 K/UL 2.6 2.8   HGB 12.0 - 16.0 g/dL 11.1 (L) 11.1 (L)    - 420 K/uL 311 344   INR 0.8 - 1.2    0.9   AST 10 - 38 U/L 43 (H) 73 (H)   ALT 13 - 56 U/L 74 (H) 115 (H)   Alk Phos 45 - 117 U/L 243 (H) 349 (H)   Bili, Total 0.2 - 1.0 MG/DL 0.3 0.2   Bili, Direct 0.0 - 0.2 MG/DL <0.1 <0.1   Albumin 3.4 - 5.0 g/dL 3.8 3.7   BUN 7.0 - 18 MG/DL 12 10   Creat 0.6 - 1.3 MG/DL 0.51 (L) 0.54 (L)   Na 136 - 145 mmol/L 140 141   K 3.5 - 5.5 mmol/L 4.3 4.2   Cl 100 - 111 mmol/L 105 108   CO2 21 - 32 mmol/L 27 25   Glucose 74 - 99 mg/dL 79 85     SEROLOGIES:  Serologies Latest Ref Rng & Units 7/31/2019   Hep A Ab, Total NEGATIVE   NEGATIVE   Hep B Surface Ag <1.00 Index <0.10   Hep B Surface Ag Interp NEG   NEGATIVE   Hep B Core Ab, Total NEGATIVE   NEGATIVE   Hep B Surface Ab >10.0 mIU/mL 21.14   Hep B Surface Ab Interp POS   POSITIVE   Hep C Ab 0.0 - 0.9 s/co ratio <0.1   Ferritin 8 - 388 NG/ML 23   Iron % Saturation % 14   JOSAFAT, IFA  NEGATIVE   C-ANCA Neg:<1:20 titer <1:20   P-ANCA Neg:<1:20 titer <1:20   ANCA Neg:<1:20 titer <1:20   ASMCA 0 - 19 Units 15   M2 Ab 0.0 - 20.0 Units 170.7 (H)   Alpha-1 antitrypsin level 90 - 200 mg/dL 125     LIVER HISTOLOGY:  8/2019. Shear wave elastography performed at [facility]. Mean liver stiffness value = 1.70 (+/- 0.05) m/s. The results suggested a fibrosis level of F2.  12/2019. [facility]. EkPa was 10.9. IQR/med 15%. . The results suggested a fibrosis level of F2. The CAP score suggests no evidence of fatty liver. ENDOSCOPIC PROCEDURES:  Not available or performed    RADIOLOGY:  2/2019. Ultrasound of liver. Echogenic consistent with possible cirrhosis. No liver mass lesions. No dilated bile ducts. No ascites. 3/2019. MRI scan abdomen. Changes consistent with cirrhosis. No liver mass lesions. No dilated bile ducts. No bile duct strictures. No ascites. 8/2019. Ultrasound of liver. Echogenic consistent with cirrhosis. No liver mass lesions. No dilated bile ducts. No ascites. OTHER TESTING:  Not available or performed    FOLLOW-UP:  All of the issues listed above in the Assessment and Plan were discussed with the patient. All questions were answered. The patient expressed a clear understanding of the above. [facility] in 6 months for routine monitoring.       Chelle Landry AGPCNP-BC  [address] Liver [place] of [address], [address] [place] [address] Street [address]  [phone]

## 2020-05-07 RX ORDER — URSODIOL 500 MG/1
500 TABLET, FILM COATED ORAL 2 TIMES DAILY
Qty: 180 TAB | Refills: 4 | Status: SHIPPED | OUTPATIENT
Start: 2020-05-07 | End: 2021-06-14

## 2020-08-19 ENCOUNTER — VIRTUAL VISIT (OUTPATIENT)
Dept: HEMATOLOGY | Age: 55
End: 2020-08-19

## 2020-08-19 DIAGNOSIS — K74.3 PRIMARY BILIARY CHOLANGITIS (HCC): Primary | ICD-10-CM

## 2020-08-19 NOTE — PROGRESS NOTES
[address] [facility], Corrie GOODMAN Mandy Comes, MD Ashok Coombes, NIVIA Argueta, ACNP-BC     Quita Seymour, [facility]   HAYLEE Moore, [facility]       Sharon Mcclelland Mikal [address]    at [facility]    [address] [place] [address] Ave, [address] Lynnette Almanza  22.    [phone]    FAX: [address] [place] [address], [address]    [phone]    FAX: [phone]       Patient Care Team:  Rubi Garcia as PCP - General (Physician Assistant)  Rubi Garcia (Physician Assistant)      Problem List  Date Reviewed: 12/16/2019          Codes Class Noted    Primary biliary cholangitis [facility]) ICD-10-CM: K74.3  ICD-9-CM: 571.6  9/11/2019            VIRTUAL TELEHEALTH VISIT PERFORMED DUE TO COVID-19 EPIDEMIC    CONSENT:  Aurelio Aponte, who was seen by synchronous, real-time, audio-video technology, and/or her healthcare decision maker, is aware that this patient-initiated, Telehealth encounter on 8/19/2020 is a billable service, with coverage as determined by her insurance carrier. She is aware that she may receive a bill and has provided verbal consent to proceed. This patient was evaluated during a Virtual Telehealth visit. A caregiver was present if appropriate. Due to this being a TeleHealth encounter performed during the [place] [address] public health emergency, the physical examination was limited to that listed in the [address] [place] [address] returns to the [address] for management of Primary Biliary Cholangitis.  The active problem list, all pertinent past medical history, medications, liver histology, radiologic findings, and laboratory findings related to the liver disorder were reviewed with the patient. The patient is a 47 y.o.  female who was found to have elevated liver transaminases and alkaline phosphatase in 2009. Serologic evaluation for markers of chronic liver disease was positive for AMA. The most recent imaging of the liver was Ultrasound performed in 8/2019. Results suggest chronic liver disease. Contour is notable for slight lobulation but there is no definite nodularity. Assessment of liver fibrosis was performed with shear wave elastography at THE [facility] in 8/2019. The result was mean liver stiffness value = 1.70 (+/- 0.05) m/s which correlates with septal fibrosis. Fibroscan was performed 12/2019. The result was E kPa of 10.9 which correlates with F2 septal fibrosis. The patient has the following symptoms which are thought to be due to the liver disease: fatigue    The patient has not experienced the following symptoms: pain in the right side over the liver, itching    The patient completes all daily activities without any functional limitations. ASSESSMENT AND PLAN:  Primary Biliary Cholangitis  The diagnosis is based upon serology and an elevation in ALP. A liver biopsy has not been performed. Assessment of liver fibrosis was performed with shear wave elastogrphy in 8/2019. The result was mean liver stiffness value = 1.70 (+/- 0.05) m/s which correlates with stage 2 septal fibrosis    Liver transaminases are elevated. ALP is elevated. Liver function is normal. The platelet count is normal.      Based upon laboratory studies, Elastography, and imaging  the patient does not appear to have advanced liver disease. The patient was started on DEANNA at a dose of 1000 mg every day in 8/2019. The side effects of DEANNA including a 2% risk of itching and a 2% risk of diarrhea were discussed. The patient is tolerating the treatment well without any side effects. The patient is responding to treatment. The ALP is improving.      Clinical Trials  The patient is not interested in participating in clinical trials for medications. She is however willing to participate in any trials for screening of progression. Screening for Hepatocellular Carcinoma  HCC screening has recently been performed and does not suggest [address] The next liver imaging study will be performed in 2/2020. The data regarding the severity of fibrosis vs cirrhosis is conflicting. To be on the safe side will continue to screen for [address] every 6 months. Treatment of other medical problems in patients with chronic liver disease  There are no contraindications for the patient to take most medications that are necessary for treatment of other medical issues. Counseling for alcohol in patients with chronic liver disease  The patient was counseled regarding alcohol consumption and the effect of alcohol on chronic liver disease. The patient does not consume any significant amount of alcohol. Vaccinations   Vaccination for viral hepatitis A is recommended since the patient has no serologic evidence of previous exposure or vaccination with immunity. Vaccination for viral hepatitis B is not needed. The patient has serologic evidence of prior exposure or vaccination with immunity. Routine vaccinations against other bacterial and viral agents can be performed as indicated. Annual flu vaccination should be administered if indicated. ALLERGIES  Allergies   Allergen Reactions    Sulfa (Sulfonamide Antibiotics) Rash       MEDICATIONS  Current Outpatient Medications   Medication Sig    ursodioL (DEANNA Forte) 500 mg tablet Take 1 Tab by mouth two (2) times a day. No current facility-administered medications for this visit. SYSTEM REVIEW NOT RELATED TO LIVER DISEASE OR REVIEWED ABOVE:  Constitution systems: Negative for fever, chills, weight gain, weight loss. Eyes: Negative for visual changes. ENT: Negative for sore throat, painful swallowing.    Respiratory: Negative for cough, hemoptysis, SOB. Cardiology: Negative for chest pain, palpitations. GI:  Negative for constipation or diarrhea. : Negative for urinary frequency, dysuria, hematuria, nocturia. Skin: Negative for rash. Hematology: Negative for easy bruising, blood clots. Musculo-skelatal: Negative for back pain, muscle pain, weakness. Neurologic: Negative for headaches, dizziness, vertigo, memory problems not related to HE. Psychology: Negative for anxiety, depression. FAMILY HISTORY:  The father  of unknown causes. The mother  from [facility]. SOCIAL HISTORY:  The patient is . The patient has 3 children, and 6 grandchildren. The patient has never used tobacco products. The patient has never consumed significant amounts of alcohol. The patient does not work outside the home. PHYSICAL EXAMINATION:  There were no vitals taken for this visit. General: No acute distress. Eyes: Sclera anicteric. ENT: No oral lesions. Nodes: No adenopathy. Skin: No spider angiomata. No jaundice. No palmar erythema. Respiratory: No wheezing, respiratory distress, cyanosis. Cardiovascular: No JVD. Abdomen: Appears soft with no obvious ascites. Extremities: No edema. No muscle wasting. No gross arthritic changes. Neurologic: Alert and oriented. Cranial nerves grossly intact. No asterixis.       LABORATORY STUDIES:  Liver [place] of [address] [place] [address] 2019 9/10/2019   WBC 4.6 - 13.2 K/uL 4.6 5.7   ANC 1.8 - 8.0 K/UL 2.1 2.6   HGB 12.0 - 16.0 g/dL 11.2 (L) 11.1 (L)    - 420 K/uL 338 311   AST 10 - 38 U/L 37 43 (H)   ALT 13 - 56 U/L 53 74 (H)   Alk Phos 45 - 117 U/L 205 (H) 243 (H)   Bili, Total 0.2 - 1.0 MG/DL 0.2 0.3   Bili, Direct 0.0 - 0.2 MG/DL <0.1 <0.1   Albumin 3.4 - 5.0 g/dL 3.7 3.8   BUN 7.0 - 18 MG/DL 9 12   Creat 0.6 - 1.3 MG/DL 0.50 (L) 0.51 (L)   Na 136 - 145 mmol/L 139 140   K 3.5 - 5.5 mmol/L 3.8 4.3   Cl 100 - 111 mmol/L 105 105   CO2 21 - 32 mmol/L 29 27   Glucose 74 - 99 mg/dL 68 (L) 79     SEROLOGIES:  Serologies Latest Ref Rng & Units 7/31/2019   Hep A Ab, Total NEGATIVE   NEGATIVE   Hep B Surface Ag <1.00 Index <0.10   Hep B Surface Ag Interp NEG   NEGATIVE   Hep B Core Ab, Total NEGATIVE   NEGATIVE   Hep B Surface Ab >10.0 mIU/mL 21.14   Hep B Surface Ab Interp POS   POSITIVE   Hep C Ab 0.0 - 0.9 s/co ratio <0.1   Ferritin 8 - 388 NG/ML 23   Iron % Saturation % 14   JOSAFAT, IFA  NEGATIVE   C-ANCA Neg:<1:20 titer <1:20   P-ANCA Neg:<1:20 titer <1:20   ANCA Neg:<1:20 titer <1:20   ASMCA 0 - 19 Units 15   M2 Ab 0.0 - 20.0 Units 170.7 (H)   Alpha-1 antitrypsin level 90 - 200 mg/dL 125     LIVER HISTOLOGY:  8/2019. Shear wave elastography performed at THE [facility]. Mean liver stiffness value = 1.70 (+/- 0.05) m/s. The results suggested a fibrosis level of F2.  12/2019. [facility]. EkPa was 10.9. IQR/med 15%. . The results suggested a fibrosis level of F2. The CAP score suggests no evidence of fatty liver. ENDOSCOPIC PROCEDURES:  Not available or performed    RADIOLOGY:  2/2019. Ultrasound of liver. Echogenic consistent with possible cirrhosis. No liver mass lesions. No dilated bile ducts. No ascites. 3/2019. MRI scan abdomen. Changes consistent with cirrhosis. No liver mass lesions. No dilated bile ducts. No bile duct strictures. No ascites. 8/2019. Ultrasound of liver. Echogenic consistent with cirrhosis. No liver mass lesions. No dilated bile ducts. No ascites. OTHER TESTING:  Not available or performed    FOLLOW-UP:  All of the issues listed above in the Assessment and Plan were discussed with the patient. All questions were answered. The patient expressed a clear understanding of the above. [facility] 87 in 6 months for routine monitoring.       Franco Reyes, ROBINSONPCNP-BC  . Anthony Gibbons 144 Liver [place] [address] [facility] [address], [address]  [phone]

## 2020-09-02 ENCOUNTER — HOSPITAL ENCOUNTER (OUTPATIENT)
Dept: ULTRASOUND IMAGING | Age: 55
Discharge: HOME | End: 2020-09-02
Attending: NURSE PRACTITIONER
Payer: OTHER GOVERNMENT

## 2020-09-02 ENCOUNTER — HOSPITAL ENCOUNTER (OUTPATIENT)
Dept: LAB | Age: 55
Discharge: HOME | End: 2020-09-02
Payer: OTHER GOVERNMENT

## 2020-09-02 DIAGNOSIS — K74.3 PRIMARY BILIARY CHOLANGITIS (HCC): ICD-10-CM

## 2020-09-02 LAB
ALBUMIN SERPL-MCNC: 3.8 G/DL (ref 3.4–5)
ALBUMIN/GLOB SERPL: 1 {RATIO} (ref 0.8–1.7)
ALP SERPL-CCNC: 168 U/L (ref 45–117)
ALT SERPL-CCNC: 42 U/L (ref 13–56)
ANION GAP SERPL CALC-SCNC: 10 MMOL/L (ref 3–18)
AST SERPL-CCNC: 27 U/L (ref 10–38)
BASOPHILS # BLD: 0 K/UL (ref 0–0.1)
BASOPHILS NFR BLD: 0 % (ref 0–2)
BILIRUB DIRECT SERPL-MCNC: <0.1 MG/DL (ref 0–0.2)
BILIRUB SERPL-MCNC: 0.2 MG/DL (ref 0.2–1)
BUN SERPL-MCNC: 10 MG/DL (ref 7–18)
BUN/CREAT SERPL: 23 (ref 12–20)
CALCIUM SERPL-MCNC: 9.4 MG/DL (ref 8.5–10.1)
CHLORIDE SERPL-SCNC: 104 MMOL/L (ref 100–111)
CO2 SERPL-SCNC: 26 MMOL/L (ref 21–32)
CREAT SERPL-MCNC: 0.43 MG/DL (ref 0.6–1.3)
DIFFERENTIAL METHOD BLD: ABNORMAL
EOSINOPHIL # BLD: 0 K/UL (ref 0–0.4)
EOSINOPHIL NFR BLD: 1 % (ref 0–5)
ERYTHROCYTE [DISTWIDTH] IN BLOOD BY AUTOMATED COUNT: 14 % (ref 11.6–14.5)
GLOBULIN SER CALC-MCNC: 3.8 G/DL (ref 2–4)
GLUCOSE SERPL-MCNC: 79 MG/DL (ref 74–99)
HCT VFR BLD AUTO: 34.5 % (ref 35–45)
HGB BLD-MCNC: 11.1 G/DL (ref 12–16)
LYMPHOCYTES # BLD: 1.7 K/UL (ref 0.9–3.6)
LYMPHOCYTES NFR BLD: 34 % (ref 21–52)
MCH RBC QN AUTO: 29.9 PG (ref 24–34)
MCHC RBC AUTO-ENTMCNC: 32.2 G/DL (ref 31–37)
MCV RBC AUTO: 93 FL (ref 74–97)
MONOCYTES # BLD: 0.7 K/UL (ref 0.05–1.2)
MONOCYTES NFR BLD: 13 % (ref 3–10)
NEUTS SEG # BLD: 2.6 K/UL (ref 1.8–8)
NEUTS SEG NFR BLD: 52 % (ref 40–73)
PLATELET # BLD AUTO: 336 K/UL (ref 135–420)
PMV BLD AUTO: 10.2 FL (ref 9.2–11.8)
POTASSIUM SERPL-SCNC: 4.3 MMOL/L (ref 3.5–5.5)
PROT SERPL-MCNC: 7.6 G/DL (ref 6.4–8.2)
RBC # BLD AUTO: 3.71 M/UL (ref 4.2–5.3)
SODIUM SERPL-SCNC: 140 MMOL/L (ref 136–145)
WBC # BLD AUTO: 4.9 K/UL (ref 4.6–13.2)

## 2020-09-02 PROCEDURE — 80076 HEPATIC FUNCTION PANEL: CPT

## 2020-09-02 PROCEDURE — 76981 USE PARENCHYMA: CPT

## 2020-09-02 PROCEDURE — 85025 COMPLETE CBC W/AUTO DIFF WBC: CPT

## 2020-09-02 PROCEDURE — 36415 COLL VENOUS BLD VENIPUNCTURE: CPT

## 2020-09-02 PROCEDURE — 80048 BASIC METABOLIC PNL TOTAL CA: CPT

## 2020-10-07 NOTE — PROGRESS NOTES
Please call patient and let them know that their recent ultrasound is stable with no concerning lesions/masses within the liver. I will see them at their next follow up appointment. Patient needs 6 month f/u visit to be made. Due 2/2021.

## 2021-02-15 ENCOUNTER — HOSPITAL ENCOUNTER (OUTPATIENT)
Dept: LAB | Age: 56
Discharge: HOME | End: 2021-02-15
Payer: OTHER GOVERNMENT

## 2021-02-15 ENCOUNTER — OFFICE VISIT (OUTPATIENT)
Dept: HEMATOLOGY | Age: 56
End: 2021-02-15
Payer: OTHER GOVERNMENT

## 2021-02-15 VITALS
HEART RATE: 72 BPM | HEIGHT: 65 IN | WEIGHT: 154 LBS | DIASTOLIC BLOOD PRESSURE: 82 MMHG | TEMPERATURE: 96.8 F | OXYGEN SATURATION: 97 % | SYSTOLIC BLOOD PRESSURE: 134 MMHG | RESPIRATION RATE: 17 BRPM | BODY MASS INDEX: 25.66 KG/M2

## 2021-02-15 DIAGNOSIS — K74.3 PRIMARY BILIARY CHOLANGITIS (HCC): ICD-10-CM

## 2021-02-15 DIAGNOSIS — K74.3 PRIMARY BILIARY CHOLANGITIS (HCC): Primary | ICD-10-CM

## 2021-02-15 LAB
ALBUMIN SERPL-MCNC: 3.9 G/DL (ref 3.4–5)
ALBUMIN/GLOB SERPL: 1.1 {RATIO} (ref 0.8–1.7)
ALP SERPL-CCNC: 192 U/L (ref 45–117)
ALT SERPL-CCNC: 48 U/L (ref 13–56)
ANION GAP SERPL CALC-SCNC: 4 MMOL/L (ref 3–18)
AST SERPL-CCNC: 28 U/L (ref 10–38)
BASOPHILS # BLD: 0 K/UL (ref 0–0.1)
BASOPHILS NFR BLD: 0 % (ref 0–2)
BILIRUB DIRECT SERPL-MCNC: <0.1 MG/DL (ref 0–0.2)
BILIRUB SERPL-MCNC: 0.2 MG/DL (ref 0.2–1)
BUN SERPL-MCNC: 10 MG/DL (ref 7–18)
BUN/CREAT SERPL: 19 (ref 12–20)
CALCIUM SERPL-MCNC: 9.3 MG/DL (ref 8.5–10.1)
CHLORIDE SERPL-SCNC: 107 MMOL/L (ref 100–111)
CO2 SERPL-SCNC: 30 MMOL/L (ref 21–32)
CREAT SERPL-MCNC: 0.54 MG/DL (ref 0.6–1.3)
DIFFERENTIAL METHOD BLD: ABNORMAL
EOSINOPHIL # BLD: 0.1 K/UL (ref 0–0.4)
EOSINOPHIL NFR BLD: 1 % (ref 0–5)
ERYTHROCYTE [DISTWIDTH] IN BLOOD BY AUTOMATED COUNT: 13.4 % (ref 11.6–14.5)
GLOBULIN SER CALC-MCNC: 3.7 G/DL (ref 2–4)
GLUCOSE SERPL-MCNC: 78 MG/DL (ref 74–99)
HCT VFR BLD AUTO: 35.1 % (ref 35–45)
HGB BLD-MCNC: 11.3 G/DL (ref 12–16)
LYMPHOCYTES # BLD: 1.9 K/UL (ref 0.9–3.6)
LYMPHOCYTES NFR BLD: 35 % (ref 21–52)
MCH RBC QN AUTO: 30.8 PG (ref 24–34)
MCHC RBC AUTO-ENTMCNC: 32.2 G/DL (ref 31–37)
MCV RBC AUTO: 95.6 FL (ref 74–97)
MONOCYTES # BLD: 0.6 K/UL (ref 0.05–1.2)
MONOCYTES NFR BLD: 12 % (ref 3–10)
NEUTS SEG # BLD: 2.7 K/UL (ref 1.8–8)
NEUTS SEG NFR BLD: 52 % (ref 40–73)
PLATELET # BLD AUTO: 384 K/UL (ref 135–420)
PMV BLD AUTO: 10.2 FL (ref 9.2–11.8)
POTASSIUM SERPL-SCNC: 3.8 MMOL/L (ref 3.5–5.5)
PROT SERPL-MCNC: 7.6 G/DL (ref 6.4–8.2)
RBC # BLD AUTO: 3.67 M/UL (ref 4.2–5.3)
SODIUM SERPL-SCNC: 141 MMOL/L (ref 136–145)
WBC # BLD AUTO: 5.3 K/UL (ref 4.6–13.2)

## 2021-02-15 PROCEDURE — 85025 COMPLETE CBC W/AUTO DIFF WBC: CPT

## 2021-02-15 PROCEDURE — 36415 COLL VENOUS BLD VENIPUNCTURE: CPT

## 2021-02-15 PROCEDURE — 80048 BASIC METABOLIC PNL TOTAL CA: CPT

## 2021-02-15 PROCEDURE — 99213 OFFICE O/P EST LOW 20 MIN: CPT | Performed by: NURSE PRACTITIONER

## 2021-02-15 PROCEDURE — 80076 HEPATIC FUNCTION PANEL: CPT

## 2021-02-15 NOTE — PROGRESS NOTES
Rafita Gomez MD, [place], Reva Hirsch MD Gracelyn Hover, NIVIA Oliveros, [facility]     Quita Seymour, [facility]-BC   Julius Sung AILYN-MARCUS Pope, [facility]       Sharon [facility] [address]    at [address], [address] Lynnette Almanza  22.    [phone]    FAX: [address] [place] [address], [address]    [phone]    FAX: [phone]       Patient Care Team:  Esvin Holley (Physician Assistant)      Problem List  Date Reviewed: 8/19/2020          Codes Class Noted    Primary biliary cholangitis [facility]) ICD-10-CM: K74.3  ICD-9-CM: 571.6  9/11/2019            Maribel Patterson [facility] for management of Primary Biliary Cholangitis. The active problem list, all pertinent past medical history, medications, liver histology, radiologic findings, and laboratory findings related to the liver disorder were reviewed with the patient. The patient is a 54 y.o.  female who was found to have elevated liver transaminases and alkaline phosphatase in 2009. Serologic evaluation for markers of chronic liver disease was positive for AMA. The most recent imaging of the liver was Ultrasound performed in 9/2020. Results suggest chronic liver disease. Contour is notable for slight lobulation but there is no definite nodularity. Assessment of liver fibrosis was performed with shear wave elastography at THE [facility] in 9/2020. The result was E mean 11.4 kPa which correlates with bridging fibrosis - cirrhosis.     The patient has the following symptoms which are thought to be due to the liver disease: fatigue    The patient has not experienced the following symptoms: pain in the right side over the liver, itching    The patient completes all daily activities without any functional limitations. ASSESSMENT AND PLAN:  Primary Biliary Cholangitis  The diagnosis is based upon serology and an elevation in ALP. A liver biopsy has not been performed. Assessment of liver fibrosis was performed with shear wave elastogrphy in 8/2019. The result was mean liver stiffness value = 1.70 (+/- 0.05) m/s which correlates with stage 2 septal fibrosis    Liver transaminases are elevated. ALP is elevated. Liver function is normal. The platelet count is normal.      Based upon laboratory studies, Elastography, and imaging  the patient does not appear to have advanced liver disease. The patient was started on DEANNA at a dose of 1000 mg every day in 8/2019. The side effects of DEANNA including a 2% risk of itching and a 2% risk of diarrhea were discussed. The patient is tolerating the treatment well without any side effects. The patient is responding to treatment. The ALP is improving. Clinical Trials  The patient is not interested in participating in clinical trials for medications. She is however willing to participate in any trials for screening of progression. Screening for Hepatocellular Carcinoma  HCC screening has recently been performed and does not suggest [address] The next liver imaging study will be performed in 3/2021. The data regarding the severity of fibrosis vs cirrhosis is conflicting. To be on the safe side will continue to screen for [address] every 6 months. Treatment of other medical problems in patients with chronic liver disease  There are no contraindications for the patient to take most medications that are necessary for treatment of other medical issues. Counseling for alcohol in patients with chronic liver disease  The patient was counseled regarding alcohol consumption and the effect of alcohol on chronic liver disease.   The patient does not consume any significant amount of alcohol. Vaccinations   Vaccination for viral hepatitis A is recommended since the patient has no serologic evidence of previous exposure or vaccination with immunity. Vaccination for viral hepatitis B is not needed. The patient has serologic evidence of prior exposure or vaccination with immunity. Routine vaccinations against other bacterial and viral agents can be performed as indicated. Annual flu vaccination should be administered if indicated. ALLERGIES  Allergies   Allergen Reactions    Sulfa (Sulfonamide Antibiotics) Rash       MEDICATIONS  Current Outpatient Medications   Medication Sig    ursodioL (DEANNA Forte) 500 mg tablet Take 1 Tab by mouth two (2) times a day. No current facility-administered medications for this visit. SYSTEM REVIEW NOT RELATED TO LIVER DISEASE OR REVIEWED ABOVE:  Constitution systems: Negative for fever, chills, weight gain, weight loss. Eyes: Negative for visual changes. ENT: Negative for sore throat, painful swallowing. Respiratory: Negative for cough, hemoptysis, SOB. Cardiology: Negative for chest pain, palpitations. GI:  Negative for constipation or diarrhea. : Negative for urinary frequency, dysuria, hematuria, nocturia. Skin: Negative for rash. Hematology: Negative for easy bruising, blood clots. Musculo-skelatal: Negative for back pain, muscle pain, weakness. Neurologic: Negative for headaches, dizziness, vertigo, memory problems not related to HE. Psychology: Negative for anxiety, depression. FAMILY HISTORY:  The father  of unknown causes. The mother  from [facility]. SOCIAL HISTORY:  The patient is . The patient has 3 children, and 6 grandchildren. The patient has never used tobacco products. The patient has never consumed significant amounts of alcohol. The patient does not work outside the home.         PHYSICAL EXAMINATION:  Visit Vitals  /82 (BP 1 Location: Right arm, BP Patient Position: Sitting)   Pulse 72   Temp 96.8 °F (36 °C)   Resp 17   Ht 5' 5\" (1.651 m)   Wt 154 lb (69.9 kg)   SpO2 97%   BMI 25.63 kg/m²       General: No acute distress. Eyes: Sclera anicteric. ENT: No oral lesions. Nodes: No adenopathy. Skin: No spider angiomata. No jaundice. No palmar erythema. Respiratory: No wheezing, respiratory distress, cyanosis. Cardiovascular: No JVD. Abdomen: Appears soft with no obvious ascites. Extremities: No edema. No muscle wasting. No gross arthritic changes. Neurologic: Alert and oriented. Cranial nerves grossly intact. No asterixis. LABORATORY STUDIES:  Liver [place] of [address] [place] [address] & Units 2/15/2021 9/2/2020   WBC 4.6 - 13.2 K/uL 5.3 4.9   ANC 1.8 - 8.0 K/UL 2.7 2.6   HGB 12.0 - 16.0 g/dL 11.3 (L) 11.1 (L)    - 420 K/uL 384 336   AST 10 - 38 U/L 28 27   ALT 13 - 56 U/L 48 42   Alk Phos 45 - 117 U/L 192 (H) 168 (H)   Bili, Total 0.2 - 1.0 MG/DL 0.2 0.2   Bili, Direct 0.0 - 0.2 MG/DL <0.1 <0.1   Albumin 3.4 - 5.0 g/dL 3.9 3.8   BUN 7.0 - 18 MG/DL 10 10   Creat 0.6 - 1.3 MG/DL 0.54 (L) 0.43 (L)   Na 136 - 145 mmol/L 141 140   K 3.5 - 5.5 mmol/L 3.8 4.3   Cl 100 - 111 mmol/L 107 104   CO2 21 - 32 mmol/L 30 26   Glucose 74 - 99 mg/dL 78 79     SEROLOGIES:  Serologies Latest Ref Rng & Units 7/31/2019   Hep A Ab, Total NEGATIVE   NEGATIVE   Hep B Surface Ag <1.00 Index <0.10   Hep B Surface Ag Interp NEG   NEGATIVE   Hep B Core Ab, Total NEGATIVE   NEGATIVE   Hep B Surface Ab >10.0 mIU/mL 21.14   Hep B Surface Ab Interp POS   POSITIVE   Hep C Ab 0.0 - 0.9 s/co ratio <0.1   Ferritin 8 - 388 NG/ML 23   Iron % Saturation % 14   JOSAFAT, IFA  NEGATIVE   C-ANCA Neg:<1:20 titer <1:20   P-ANCA Neg:<1:20 titer <1:20   ANCA Neg:<1:20 titer <1:20   ASMCA 0 - 19 Units 15   M2 Ab 0.0 - 20.0 Units 170.7 (H)   Alpha-1 antitrypsin level 90 - 200 mg/dL 125     LIVER HISTOLOGY:  8/2019. Shear wave elastography performed at THE [facility].  Mean liver stiffness value = 1.70 (+/- 0.05) m/s. The results suggested a fibrosis level of F2.  12/2019. [facility]. EkPa was 10.9. IQR/med 15%. . The results suggested a fibrosis level of F2. The CAP score suggests no evidence of fatty liver. 9/2020. Shear wave elastography performed at THE [facility]. EkPa was E Range: 10.4-12.7 kPa, E Mean: 11.4 kPa, E Median: 11.2 kPa, E Std: 0.8 kPa. The results suggested a fibrosis level of F3-4. ENDOSCOPIC PROCEDURES:  Not available or performed    RADIOLOGY:  2/2019. Ultrasound of liver. Echogenic consistent with possible cirrhosis. No liver mass lesions. No dilated bile ducts. No ascites. 3/2019. MRI scan abdomen. Changes consistent with cirrhosis. No liver mass lesions. No dilated bile ducts. No bile duct strictures. No ascites. 8/2019. Ultrasound of liver. Echogenic consistent with cirrhosis. No liver mass lesions. No dilated bile ducts. No ascites. 9/2020. Ultrasound of liver. Echogenic consistent with cirrhosis. No liver mass lesions. No dilated bile ducts. No ascites. OTHER TESTING:  Not available or performed    FOLLOW-UP:  All of the issues listed above in the Assessment and Plan were discussed with the patient. All questions were answered. The patient expressed a clear understanding of the above. [address] [place] [address] in 6 months for routine monitoring.       Shantanu Barakat, AGPCNP-BC  [address] Anthony Gibbons 144 Liver [place] of [address], [facility] [address]  98 [facility], [address]  [phone]

## 2021-03-16 ENCOUNTER — HOSPITAL ENCOUNTER (OUTPATIENT)
Dept: ULTRASOUND IMAGING | Age: 56
Discharge: HOME | End: 2021-03-16
Attending: NURSE PRACTITIONER
Payer: OTHER GOVERNMENT

## 2021-03-16 DIAGNOSIS — K74.3 PRIMARY BILIARY CHOLANGITIS (HCC): ICD-10-CM

## 2021-03-16 PROCEDURE — 76705 ECHO EXAM OF ABDOMEN: CPT

## 2021-06-14 RX ORDER — URSODIOL 500 MG/1
TABLET, FILM COATED ORAL
Qty: 180 TABLET | Refills: 3 | Status: SHIPPED | OUTPATIENT
Start: 2021-06-14 | End: 2022-07-05

## 2021-08-02 ENCOUNTER — TELEPHONE (OUTPATIENT)
Dept: HEMATOLOGY | Age: 56
End: 2021-08-02

## 2021-08-02 NOTE — TELEPHONE ENCOUNTER
Called patient. Verified name and date of birth. Advised patient that I was returning her call to get her scheduled for screening for [place] Protocol. Scheduled patient for August 17,2021 @7:15 arrival time. Patient confirmed. Advised patient that she would need to be fasting that she would receive blood work and a ECG on that day also. Patient verbalized understanding and no further questions at this time.

## 2021-08-17 ENCOUNTER — HOSPITAL ENCOUNTER (OUTPATIENT)
Dept: NON INVASIVE DIAGNOSTICS | Age: 56
Discharge: HOME | End: 2021-08-17

## 2021-08-17 ENCOUNTER — TRANSCRIBE ORDER (OUTPATIENT)
Dept: REGISTRATION | Age: 56
End: 2021-08-17

## 2021-08-17 ENCOUNTER — OFFICE VISIT (OUTPATIENT)
Dept: HEMATOLOGY | Age: 56
End: 2021-08-17

## 2021-08-17 ENCOUNTER — HOSPITAL ENCOUNTER (OUTPATIENT)
Dept: LAB | Age: 56
Discharge: HOME | End: 2021-08-17

## 2021-08-17 DIAGNOSIS — Z00.6 EXAMINATION OF PARTICIPANT IN CLINICAL TRIAL: Primary | ICD-10-CM

## 2021-08-17 DIAGNOSIS — Z00.6 EXAMINATION OF PARTICIPANT IN CLINICAL TRIAL: ICD-10-CM

## 2021-08-17 LAB
ATRIAL RATE: 62 BPM
CALCULATED P AXIS, ECG09: 62 DEGREES
CALCULATED R AXIS, ECG10: 67 DEGREES
CALCULATED T AXIS, ECG11: 53 DEGREES
DIAGNOSIS, 93000: NORMAL
P-R INTERVAL, ECG05: 160 MS
Q-T INTERVAL, ECG07: 420 MS
QRS DURATION, ECG06: 88 MS
QTC CALCULATION (BEZET), ECG08: 426 MS
VENTRICULAR RATE, ECG03: 62 BPM

## 2021-08-17 PROCEDURE — 99214 OFFICE O/P EST MOD 30 MIN: CPT | Performed by: NURSE PRACTITIONER

## 2021-08-17 PROCEDURE — 99000 SPECIMEN HANDLING OFFICE-LAB: CPT

## 2021-08-17 PROCEDURE — 93005 ELECTROCARDIOGRAM TRACING: CPT

## 2021-08-17 NOTE — PROGRESS NOTES
Eugene Morales MD, Evert Morris MD, MPH      Kristina Barragan, NIVIA Davenport, [facility]-BENY Menard FNP-C Felix Beck, AGPCNP-BC [address] [facility] [address]    at [address], [address] Lynnette Almanza  22.    [phone]    FAX: [address], [address] [phone]    FAX: 124 [facility] FABI Power is a 54 y.o. female with PBC. The patient was seen today for screening to enroll into the [place]ide clinical trial for patients with PBC. The consent form was reviewed with the patient. Potential side effects which may occur in the clinical trial were discussed. The potential benefits of the investigational product were discussed. The patient is aware this is a randomized placebo-controlled clinical trial and s/he may be receiving placebo. All questions raised by the patient were answered. The patient has decided to enter the clinical study and the consent for was signed. The consent form was signed before any physical examination, laboratory or other testing was performed. The medical history was reviewed.         BENY Harrison. Anthony Gibbons [facility] Liver [place] of [address], [facility] [address]  Shankar Brannon, [address] [phone]

## 2021-08-31 ENCOUNTER — HOSPITAL ENCOUNTER (OUTPATIENT)
Dept: LAB | Age: 56
Discharge: HOME | End: 2021-08-31

## 2021-08-31 ENCOUNTER — OFFICE VISIT (OUTPATIENT)
Dept: HEMATOLOGY | Age: 56
End: 2021-08-31

## 2021-08-31 DIAGNOSIS — Z00.6 EXAMINATION OF PARTICIPANT IN CLINICAL TRIAL: Primary | ICD-10-CM

## 2021-08-31 PROCEDURE — 99213 OFFICE O/P EST LOW 20 MIN: CPT | Performed by: NURSE PRACTITIONER

## 2021-08-31 PROCEDURE — 99000 SPECIMEN HANDLING OFFICE-LAB: CPT

## 2021-08-31 NOTE — PROGRESS NOTES
Daria Johnson MD, Yuki Cha MD, MPH      Romaine Dennis, NIVIA Hsu, [facility]     NIK Mcmahan-MARIA C Webber NIK Molina-BC       Sharon Goff 136    at [facility]    [facility], [address] Lynnette Almanza  22.    [phone]    FAX: [address], [address], [address] [phone]    FAX: 124 [facility] NOTE    Bailee Nix is a 54 y.o. female with PBC. The patient was seen today for re-screening to enroll into the HighTide clinical trial for patients with PBC. The potential benefits of the investigational product were discussed. All questions raised by the patient were answered. The patient was here to repeat ALP level. Historical ALP was 192 in 2/2021. Symptoms of clinical significance were:  None    The patient will return for follow-up per study protocol.       ROBINSON CarterNP-BC  Jesse. Anthony Gibbons 144 Liver [place] of [facility]  [address], [address] [address]  98 Amanda Anayeli Stephens, [address] [phone]

## 2021-09-21 ENCOUNTER — DOCUMENTATION ONLY (OUTPATIENT)
Dept: HEMATOLOGY | Age: 56
End: 2021-09-21

## 2021-09-21 NOTE — PROGRESS NOTES
Called patient. Verified name and date of birth. Informed patient that after receiving most recent repeat labs that she did not meet the inclusion criteria for the research protocol [place] 1801 that she was being screened for. Informed patient that we would keep her information and be in contact in the future if she meets criteria for any studies. Patient voiced understanding and had no further questions.

## 2022-03-19 PROBLEM — K74.3 PRIMARY BILIARY CHOLANGITIS (HCC): Status: ACTIVE | Noted: 2019-09-11

## 2022-04-04 ENCOUNTER — OFFICE VISIT (OUTPATIENT)
Dept: HEMATOLOGY | Age: 57
End: 2022-04-04
Payer: OTHER GOVERNMENT

## 2022-04-04 ENCOUNTER — HOSPITAL ENCOUNTER (OUTPATIENT)
Dept: LAB | Age: 57
Discharge: HOME | End: 2022-04-04
Payer: OTHER GOVERNMENT

## 2022-04-04 VITALS
TEMPERATURE: 98 F | HEART RATE: 80 BPM | HEIGHT: 63 IN | OXYGEN SATURATION: 98 % | SYSTOLIC BLOOD PRESSURE: 123 MMHG | RESPIRATION RATE: 20 BRPM | BODY MASS INDEX: 27.28 KG/M2 | DIASTOLIC BLOOD PRESSURE: 72 MMHG

## 2022-04-04 DIAGNOSIS — K74.3 PRIMARY BILIARY CHOLANGITIS (HCC): Primary | ICD-10-CM

## 2022-04-04 DIAGNOSIS — K74.3 PRIMARY BILIARY CHOLANGITIS (HCC): ICD-10-CM

## 2022-04-04 LAB
ALBUMIN SERPL-MCNC: 4 G/DL (ref 3.4–5)
ALBUMIN/GLOB SERPL: 1.1 {RATIO} (ref 0.8–1.7)
ALP SERPL-CCNC: 159 U/L (ref 45–117)
ALT SERPL-CCNC: 44 U/L (ref 13–56)
ANION GAP SERPL CALC-SCNC: 3 MMOL/L (ref 3–18)
AST SERPL-CCNC: 31 U/L (ref 10–38)
BASOPHILS # BLD: 0 K/UL (ref 0–0.1)
BASOPHILS NFR BLD: 0 % (ref 0–2)
BILIRUB DIRECT SERPL-MCNC: <0.1 MG/DL (ref 0–0.2)
BILIRUB SERPL-MCNC: 0.2 MG/DL (ref 0.2–1)
BUN SERPL-MCNC: 15 MG/DL (ref 7–18)
BUN/CREAT SERPL: 24 (ref 12–20)
CALCIUM SERPL-MCNC: 9.3 MG/DL (ref 8.5–10.1)
CHLORIDE SERPL-SCNC: 107 MMOL/L (ref 100–111)
CO2 SERPL-SCNC: 30 MMOL/L (ref 21–32)
CREAT SERPL-MCNC: 0.62 MG/DL (ref 0.6–1.3)
DIFFERENTIAL METHOD BLD: ABNORMAL
EOSINOPHIL # BLD: 0.1 K/UL (ref 0–0.4)
EOSINOPHIL NFR BLD: 1 % (ref 0–5)
ERYTHROCYTE [DISTWIDTH] IN BLOOD BY AUTOMATED COUNT: 13.7 % (ref 11.6–14.5)
GLOBULIN SER CALC-MCNC: 3.6 G/DL (ref 2–4)
GLUCOSE SERPL-MCNC: 114 MG/DL (ref 74–99)
HCT VFR BLD AUTO: 32.9 % (ref 35–45)
HGB BLD-MCNC: 10.5 G/DL (ref 12–16)
IMM GRANULOCYTES # BLD AUTO: 0 K/UL (ref 0–0.04)
IMM GRANULOCYTES NFR BLD AUTO: 1 % (ref 0–0.5)
LYMPHOCYTES # BLD: 1.6 K/UL (ref 0.9–3.6)
LYMPHOCYTES NFR BLD: 32 % (ref 21–52)
MCH RBC QN AUTO: 29.2 PG (ref 24–34)
MCHC RBC AUTO-ENTMCNC: 31.9 G/DL (ref 31–37)
MCV RBC AUTO: 91.4 FL (ref 78–100)
MONOCYTES # BLD: 0.5 K/UL (ref 0.05–1.2)
MONOCYTES NFR BLD: 11 % (ref 3–10)
NEUTS SEG # BLD: 2.8 K/UL (ref 1.8–8)
NEUTS SEG NFR BLD: 55 % (ref 40–73)
NRBC # BLD: 0 K/UL (ref 0–0.01)
NRBC BLD-RTO: 0 PER 100 WBC
PLATELET # BLD AUTO: 399 K/UL (ref 135–420)
PMV BLD AUTO: 10 FL (ref 9.2–11.8)
POTASSIUM SERPL-SCNC: 4.3 MMOL/L (ref 3.5–5.5)
PROT SERPL-MCNC: 7.6 G/DL (ref 6.4–8.2)
RBC # BLD AUTO: 3.6 M/UL (ref 4.2–5.3)
SODIUM SERPL-SCNC: 140 MMOL/L (ref 136–145)
WBC # BLD AUTO: 5 K/UL (ref 4.6–13.2)

## 2022-04-04 PROCEDURE — 36415 COLL VENOUS BLD VENIPUNCTURE: CPT

## 2022-04-04 PROCEDURE — 80076 HEPATIC FUNCTION PANEL: CPT

## 2022-04-04 PROCEDURE — 80048 BASIC METABOLIC PNL TOTAL CA: CPT

## 2022-04-04 PROCEDURE — 99213 OFFICE O/P EST LOW 20 MIN: CPT | Performed by: NURSE PRACTITIONER

## 2022-04-04 PROCEDURE — 85025 COMPLETE CBC W/AUTO DIFF WBC: CPT

## 2022-04-04 NOTE — PROGRESS NOTES
[address]40 [facility], MD, [address], [place], [place]      NIVIA Mireles, [facility]     April S Lion [facility]   Earlene Obrien, HAYLEE Zaragoza, [facility]       Sharon Leeuta Mikal [address]    at [address], [address] [facility], Lynnette  22.    [phone]    FAX: [address] [place] [address]    at [address], [address], [address]    [phone]    FAX: [phone]     Patient Care Team:  Unknown, Provider, NP as PCP - General Vijay Rivera (Physician Assistant)      Problem List  Date Reviewed: 8/17/2021          Codes Class Noted    Primary biliary cholangitis [facility]) ICD-10-CM: K74.3  ICD-9-CM: 571.6  9/11/2019            Mahogany Mata returns to the [address] for management of Primary Biliary Cholangitis. The active problem list, all pertinent past medical history, medications, liver histology, radiologic findings, and laboratory findings related to the liver disorder were reviewed with the patient. The patient is a 64 y.o. female who was found to have elevated liver transaminases and alkaline phosphatase in 2009. Serologic evaluation for markers of chronic liver disease was positive for AMA. The most recent imaging of the liver was Ultrasound performed in 9/2020. Results suggest chronic liver disease. Contour is notable for slight lobulation but there is no definite nodularity. Assessment of liver fibrosis was performed with shear wave elastography at THE [facility] in 9/2020. The result was E mean 11.4 kPa which correlates with bridging fibrosis - cirrhosis.     The patient has the following symptoms which are thought to be due to the liver disease: fatigue    The patient has not experienced the following symptoms: pain in the right side over the liver, itching    The patient completes all daily activities without any functional limitations. ASSESSMENT AND PLAN:  Primary Biliary Cholangitis  The diagnosis is based upon serology and an elevation in ALP. A liver biopsy has not been performed. Assessment of liver fibrosis was performed with shear wave elastogrphy in 8/2019. The result was mean liver stiffness value = 1.70 (+/- 0.05) m/s which correlates with stage 2 septal fibrosis    Liver transaminases are normal.  ALP is elevated. Liver function is normal. The platelet count is normal.      Based upon laboratory studies, Elastography, and imaging  the patient does not appear to have advanced liver disease. The patient was started on DEANNA at a dose of 1000 mg every day in 8/2019. The side effects of DEANNA including a 2% risk of itching and a 2% risk of diarrhea were discussed. The patient is tolerating the treatment well without any side effects. The patient is responding to treatment. The ALP is improving. Clinical Trials  The patient is not interested in participating in clinical trials for medications. She is however willing to participate in any trials for screening of progression. Screening for Hepatocellular Carcinoma  [address] screening has not been not been performed since 3/2021. The data regarding the severity of fibrosis vs cirrhosis is conflicting. To be on the safe side it is recommended that we screen for [address] every 6 months. This will be scheduled (patient is undergoing shoulder surgery this month) will defer until after recovered. Treatment of other medical problems in patients with chronic liver disease  There are no contraindications for the patient to take most medications that are necessary for treatment of other medical issues.     Counseling for alcohol in patients with chronic liver disease  The patient was counseled regarding alcohol consumption and the effect of alcohol on chronic liver disease. The patient does not consume any significant amount of alcohol. Vaccinations   Vaccination for viral hepatitis A is recommended since the patient has no serologic evidence of previous exposure or vaccination with immunity. Vaccination for viral hepatitis B is not needed. The patient has serologic evidence of prior exposure or vaccination with immunity. Routine vaccinations against other bacterial and viral agents can be performed as indicated. Annual flu vaccination should be administered if indicated. ALLERGIES  Allergies   Allergen Reactions    Sulfa (Sulfonamide Antibiotics) Rash       MEDICATIONS  Current Outpatient Medications   Medication Sig    ursodioL (ACTIGALL) 500 mg tablet TAKE 1 TABLET TWICE A DAY     No current facility-administered medications for this visit. SYSTEM REVIEW NOT RELATED TO LIVER DISEASE OR REVIEWED ABOVE:  Constitution systems: Negative for fever, chills, weight gain, weight loss. Eyes: Negative for visual changes. ENT: Negative for sore throat, painful swallowing. Respiratory: Negative for cough, hemoptysis, SOB. Cardiology: Negative for chest pain, palpitations. GI:  Negative for constipation or diarrhea. : Negative for urinary frequency, dysuria, hematuria, nocturia. Skin: Negative for rash. Hematology: Negative for easy bruising, blood clots. Musculo-skelatal: Negative for back pain, muscle pain, weakness. Neurologic: Negative for headaches, dizziness, vertigo, memory problems not related to HE. Psychology: Negative for anxiety, depression. FAMILY HISTORY:  The father  of unknown causes. The mother  from [facility]. SOCIAL HISTORY:  The patient is . The patient has 3 children, and 6 grandchildren. The patient has never used tobacco products. The patient has never consumed significant amounts of alcohol. The patient does not work outside the home.       PHYSICAL EXAMINATION:  Visit Vitals  BP 123/72   Pulse 80   Temp 98 °F (36.7 °C)   Resp 20   Ht 5' 3\" (1.6 m)   SpO2 98%   BMI 27.28 kg/m²     General: No acute distress. Eyes: Sclera anicteric. ENT: No oral lesions. Nodes: No adenopathy. Skin: No spider angiomata. No jaundice. No palmar erythema. Respiratory: No wheezing, respiratory distress, cyanosis. Cardiovascular: No JVD. Abdomen: Appears soft with no obvious ascites. Extremities: No edema. No muscle wasting. No gross arthritic changes. Neurologic: Alert and oriented. Cranial nerves grossly intact. No asterixis. LABORATORY STUDIES:  Liver [place] of [address] [place] [address] Units 2/15/2021   WBC 4.6 - 13.2 K/uL 5.3   ANC 1.8 - 8.0 K/UL 2.7   HGB 12.0 - 16.0 g/dL 11.3 (L)    - 420 K/uL 384   AST 10 - 38 U/L 28   ALT 13 - 56 U/L 48   Alk Phos 45 - 117 U/L 192 (H)   Bili, Total 0.2 - 1.0 MG/DL 0.2   Bili, Direct 0.0 - 0.2 MG/DL <0.1   Albumin 3.4 - 5.0 g/dL 3.9   BUN 7.0 - 18 MG/DL 10   Creat 0.6 - 1.3 MG/DL 0.54 (L)   Na 136 - 145 mmol/L 141   K 3.5 - 5.5 mmol/L 3.8   Cl 100 - 111 mmol/L 107   CO2 21 - 32 mmol/L 30   Glucose 74 - 99 mg/dL 78     SEROLOGIES:  Serologies Latest Ref Rng & Units 7/31/2019   Hep A Ab, Total NEGATIVE   NEGATIVE   Hep B Surface Ag <1.00 Index <0.10   Hep B Surface Ag Interp NEG   NEGATIVE   Hep B Core Ab, Total NEGATIVE   NEGATIVE   Hep B Surface Ab >10.0 mIU/mL 21.14   Hep B Surface Ab Interp POS   POSITIVE   Hep C Ab 0.0 - 0.9 s/co ratio <0.1   Ferritin 8 - 388 NG/ML 23   Iron % Saturation % 14   JOSAFAT, IFA  NEGATIVE   C-ANCA Neg:<1:20 titer <1:20   P-ANCA Neg:<1:20 titer <1:20   ANCA Neg:<1:20 titer <1:20   ASMCA 0 - 19 Units 15   M2 Ab 0.0 - 20.0 Units 170.7 (H)   Alpha-1 antitrypsin level 90 - 200 mg/dL 125     LIVER HISTOLOGY:  8/2019. Shear wave elastography performed at [facility]. Mean liver stiffness value = 1.70 (+/- 0.05) m/s. The results suggested a fibrosis level of F2.  12/2019. FibroScan performed at [address]  Constantino was 10.9. IQR/med 15%. . The results suggested a fibrosis level of F2. The CAP score suggests no evidence of fatty liver. 9/2020. Shear wave elastography performed at [facility]. EkPa was E Range: 10.4-12.7 kPa, E Mean: 11.4 kPa, E Median: 11.2 kPa, E Std: 0.8 kPa. The results suggested a fibrosis level of F3-4. ENDOSCOPIC PROCEDURES:  Not available or performed    RADIOLOGY:  9/2020. Ultrasound of liver. Echogenic consistent with cirrhosis. No liver mass lesions. No dilated bile ducts. No ascites. 3/2021. Ultrasound of liver. Echogenic consistent with chronic liver disease. No liver mass lesions. No dilated bile ducts. No ascites. OTHER TESTING:  Not available or performed    FOLLOW-UP:  All of the issues listed above in the Assessment and Plan were discussed with the patient. All questions were answered. The patient expressed a clear understanding of the above. [address] [address] in 6 months for routine monitoring. Fibroscan at next visit.       BENY Renteria  . [facility] Liver [place] [address]  [place], [address] [place] [address] - [address]  [phone]

## 2022-07-05 RX ORDER — URSODIOL 500 MG/1
TABLET, FILM COATED ORAL
Qty: 180 TABLET | Refills: 3 | Status: SHIPPED | OUTPATIENT
Start: 2022-07-05

## 2022-10-13 ENCOUNTER — OFFICE VISIT (OUTPATIENT)
Dept: HEMATOLOGY | Age: 57
End: 2022-10-13
Payer: OTHER GOVERNMENT

## 2022-10-13 ENCOUNTER — HOSPITAL ENCOUNTER (OUTPATIENT)
Dept: LAB | Age: 57
Discharge: HOME | End: 2022-10-13
Payer: OTHER GOVERNMENT

## 2022-10-13 VITALS
HEIGHT: 63 IN | OXYGEN SATURATION: 98 % | DIASTOLIC BLOOD PRESSURE: 87 MMHG | WEIGHT: 139 LBS | TEMPERATURE: 97 F | SYSTOLIC BLOOD PRESSURE: 124 MMHG | HEART RATE: 85 BPM | BODY MASS INDEX: 24.63 KG/M2

## 2022-10-13 DIAGNOSIS — K74.3 PRIMARY BILIARY CHOLANGITIS (HCC): Primary | ICD-10-CM

## 2022-10-13 DIAGNOSIS — K74.3 PRIMARY BILIARY CHOLANGITIS (HCC): ICD-10-CM

## 2022-10-13 PROBLEM — M81.0 OSTEOPOROSIS: Status: ACTIVE | Noted: 2022-10-13

## 2022-10-13 LAB
ALBUMIN SERPL-MCNC: 3.9 G/DL (ref 3.4–5)
ALBUMIN/GLOB SERPL: 1.1 {RATIO} (ref 0.8–1.7)
ALP SERPL-CCNC: 201 U/L (ref 45–117)
ALT SERPL-CCNC: 48 U/L (ref 13–56)
ANION GAP SERPL CALC-SCNC: 8 MMOL/L (ref 3–18)
AST SERPL-CCNC: 31 U/L (ref 10–38)
BASOPHILS # BLD: 0 K/UL (ref 0–0.1)
BASOPHILS NFR BLD: 0 % (ref 0–2)
BILIRUB DIRECT SERPL-MCNC: <0.1 MG/DL (ref 0–0.2)
BILIRUB SERPL-MCNC: 0.3 MG/DL (ref 0.2–1)
BUN SERPL-MCNC: 10 MG/DL (ref 7–18)
BUN/CREAT SERPL: 20 (ref 12–20)
CALCIUM SERPL-MCNC: 9.6 MG/DL (ref 8.5–10.1)
CHLORIDE SERPL-SCNC: 106 MMOL/L (ref 100–111)
CO2 SERPL-SCNC: 25 MMOL/L (ref 21–32)
CREAT SERPL-MCNC: 0.49 MG/DL (ref 0.6–1.3)
DIFFERENTIAL METHOD BLD: ABNORMAL
EOSINOPHIL # BLD: 0 K/UL (ref 0–0.4)
EOSINOPHIL NFR BLD: 1 % (ref 0–5)
ERYTHROCYTE [DISTWIDTH] IN BLOOD BY AUTOMATED COUNT: 19.3 % (ref 11.6–14.5)
GLOBULIN SER CALC-MCNC: 3.6 G/DL (ref 2–4)
GLUCOSE SERPL-MCNC: 74 MG/DL (ref 74–99)
HCT VFR BLD AUTO: 30.5 % (ref 35–45)
HGB BLD-MCNC: 9.2 G/DL (ref 12–16)
IMM GRANULOCYTES # BLD AUTO: 0 K/UL (ref 0–0.04)
IMM GRANULOCYTES NFR BLD AUTO: 1 % (ref 0–0.5)
LYMPHOCYTES # BLD: 1.8 K/UL (ref 0.9–3.6)
LYMPHOCYTES NFR BLD: 30 % (ref 21–52)
MCH RBC QN AUTO: 25.1 PG (ref 24–34)
MCHC RBC AUTO-ENTMCNC: 30.2 G/DL (ref 31–37)
MCV RBC AUTO: 83.1 FL (ref 78–100)
MONOCYTES # BLD: 0.8 K/UL (ref 0.05–1.2)
MONOCYTES NFR BLD: 13 % (ref 3–10)
NEUTS SEG # BLD: 3.4 K/UL (ref 1.8–8)
NEUTS SEG NFR BLD: 56 % (ref 40–73)
NRBC # BLD: 0 K/UL (ref 0–0.01)
NRBC BLD-RTO: 0 PER 100 WBC
PLATELET # BLD AUTO: 482 K/UL (ref 135–420)
PMV BLD AUTO: 9.8 FL (ref 9.2–11.8)
POTASSIUM SERPL-SCNC: 4.1 MMOL/L (ref 3.5–5.5)
PROT SERPL-MCNC: 7.5 G/DL (ref 6.4–8.2)
RBC # BLD AUTO: 3.67 M/UL (ref 4.2–5.3)
SODIUM SERPL-SCNC: 139 MMOL/L (ref 136–145)
WBC # BLD AUTO: 6.1 K/UL (ref 4.6–13.2)

## 2022-10-13 PROCEDURE — 91200 LIVER ELASTOGRAPHY: CPT | Performed by: NURSE PRACTITIONER

## 2022-10-13 PROCEDURE — 80048 BASIC METABOLIC PNL TOTAL CA: CPT

## 2022-10-13 PROCEDURE — 99213 OFFICE O/P EST LOW 20 MIN: CPT | Performed by: NURSE PRACTITIONER

## 2022-10-13 PROCEDURE — 82107 ALPHA-FETOPROTEIN L3: CPT

## 2022-10-13 PROCEDURE — 80076 HEPATIC FUNCTION PANEL: CPT

## 2022-10-13 PROCEDURE — 36415 COLL VENOUS BLD VENIPUNCTURE: CPT

## 2022-10-13 PROCEDURE — 85025 COMPLETE CBC W/AUTO DIFF WBC: CPT

## 2022-10-13 RX ORDER — TERIPARATIDE 250 UG/ML
INJECTION, SOLUTION SUBCUTANEOUS
COMMUNITY
Start: 2022-09-22

## 2022-10-13 NOTE — PROGRESS NOTES
3340 [facility], MD, [address], [place], [place]      NIVIA Arrington, [facility]     Quita Seymour, [facility]   eMir Dillard, P-MARCUS Davila, [facility]       Sharon Mcclelland Mikal De Luo 136    at [address], [address] Lynnette Almanza  22.    [phone]    FAX: [address] [place] [address] Avenue    at [facility]    [address], [address] [place] [address], [address] [place] [address] Street [address]    [phone]    FAX: [phone]     Patient Care Team:  Unknown, Provider, MD as PCP - General Amanda Aaron (Physician Assistant)      Problem List  Date Reviewed: 4/4/2022            Codes Class Noted    Osteoporosis ICD-10-CM: M81.0  ICD-9-CM: 733.00  10/13/2022        Primary biliary cholangitis ([facility] ICD-10-CM: K74.3  ICD-9-CM: 571.6  9/11/2019         Megha Found returns to the [facility] for management of Primary Biliary Cholangitis. The active problem list, all pertinent past medical history, medications, liver histology, radiologic findings, and laboratory findings related to the liver disorder were reviewed with the patient. The patient is a 62 y.o. female who was found to have elevated liver transaminases and alkaline phosphatase in 2009. Serologic evaluation for markers of chronic liver disease was positive for AMA. The most recent imaging of the liver was Ultrasound performed in 9/2020. Results suggest chronic liver disease. Contour is notable for slight lobulation but there is no definite nodularity. Assessment of liver fibrosis with Fibroscan was performed in the office today. The result was 6.4 kPa which correlates with stage 1 fibrosis. The CAP score of 246 does not suggest hepatic steatosis.     The patient has the following symptoms which are thought to be due to the liver disease: fatigue    The patient has not experienced the following symptoms: pain in the right side over the liver, itching    The patient completes all daily activities without any functional limitations. ASSESSMENT AND PLAN:  Primary Biliary Cholangitis  The diagnosis is based upon serology and an elevation in ALP. A liver biopsy has not been performed. Assessment of liver fibrosis was performed with shear wave elastogrphy in 8/2019. The result was mean liver stiffness value = 1.70 (+/- 0.05) m/s which correlates with stage 2 septal fibrosis    Liver transaminases are normal.  ALP is elevated. Liver function is normal. The platelet count is normal.      Based upon laboratory studies, Elastography, and imaging  the patient does not appear to have advanced liver disease. The patient was started on DEANNA at a dose of 1000 mg every day in 8/2019. The side effects of DEANNA including a 2% risk of itching and a 2% risk of diarrhea were discussed. The patient is tolerating the treatment well without any side effects. The patient is responding to treatment. The ALP is improving however it has not come down to normal.     Clinical Trials  The patient is not interested in participating in clinical trials for medications. She is however willing to participate in any trials for screening of progression. Screening for Hepatocellular Carcinoma  [address] screening has not been not been performed since 3/2021. The data regarding the severity of fibrosis vs cirrhosis is conflicting. To be on the safe side it is recommended that we screen for [address] every 6 months. This will be scheduled (patient is undergoing shoulder surgery this month) will defer until after recovered. Treatment of other medical problems in patients with chronic liver disease  There are no contraindications for the patient to take most medications that are necessary for treatment of other medical issues.     Counseling for alcohol in patients with chronic liver disease  The patient was counseled regarding alcohol consumption and the effect of alcohol on chronic liver disease. The patient does not consume any significant amount of alcohol. Hypercholesterolemia   This is common in patients with PBC. Controlled clinical trials demonstrate that women with PBC do not have an increased risk of CAD depsite the elevated total cholesterol. The cholesterol is typically HDL and does not always require treatment. Statins are not contraindicated if felt to be clinically warranted. Vitamin malabsorption  Patients with PBC do not efficiently absorb fat soluble vitamins A,D,E, K. It has been recommended that the patient take multivitamins with excess fat soluble vitamins. Breast cancer screeing   Women with PBC have an increased risk of breast cancer and should undergo regular mammography screenings. Osteoporosis  The risk of osteoporosis is increased in patients with cirrhosis. DEXA bone density to assess for osteoporosis was last performed in 5/2022. The results demonstrate osteoporosis. The patient is on a bisphosphonate. Vaccinations   Vaccination for viral hepatitis A is recommended since the patient has no serologic evidence of previous exposure or vaccination with immunity. Vaccination for viral hepatitis B is not needed. The patient has serologic evidence of prior exposure or vaccination with immunity. Routine vaccinations against other bacterial and viral agents can be performed as indicated. Annual flu vaccination should be administered if indicated. ALLERGIES  Allergies   Allergen Reactions    Sulfa (Sulfonamide Antibiotics) Rash       MEDICATIONS  Current Outpatient Medications   Medication Sig    Forteo 20 mcg/dose (600mcg/2.4mL) pnij injection     ursodioL (ACTIGALL) 500 mg tablet TAKE 1 TABLET TWICE A DAY     No current facility-administered medications for this visit.        SYSTEM REVIEW NOT RELATED TO LIVER DISEASE OR REVIEWED ABOVE:  Constitution systems: Negative for fever, chills, weight gain, weight loss. Eyes: Negative for visual changes. ENT: Negative for sore throat, painful swallowing. Respiratory: Negative for cough, hemoptysis, SOB. Cardiology: Negative for chest pain, palpitations. GI:  Negative for constipation or diarrhea. : Negative for urinary frequency, dysuria, hematuria, nocturia. Skin: Negative for rash. Hematology: Negative for easy bruising, blood clots. Musculo-skelatal: Negative for back pain, muscle pain, weakness. Neurologic: Negative for headaches, dizziness, vertigo, memory problems not related to HE. Psychology: Negative for anxiety, depression. FAMILY HISTORY:  The father  of unknown causes. The mother  from [facility]. SOCIAL HISTORY:  The patient is . The patient has 3 children, and 6 grandchildren. The patient has never used tobacco products. The patient has never consumed significant amounts of alcohol. The patient does not work outside the home. PHYSICAL EXAMINATION:  Visit Vitals  /87   Pulse 85   Temp 97 °F (36.1 °C)   Ht 5' 3\" (1.6 m)   Wt 139 lb (63 kg)   SpO2 98%   BMI 24.62 kg/m²     General: No acute distress. Eyes: Sclera anicteric. ENT: No oral lesions. Nodes: No adenopathy. Skin: No spider angiomata. No jaundice. No palmar erythema. Respiratory: No wheezing, respiratory distress, cyanosis. Cardiovascular: No JVD. Abdomen: Appears soft with no obvious ascites. Extremities: No edema. No muscle wasting. No gross arthritic changes. Neurologic: Alert and oriented. Cranial nerves grossly intact. No asterixis.     LABORATORY STUDIES:  Liver [place] of [address] Units 10/13/2022 2022   WBC 4.6 - 13.2 K/uL 6.1 5.0   ANC 1.8 - 8.0 K/UL 3.4 2.8   HGB 12.0 - 16.0 g/dL 9.2 (L) 10.5 (L)    - 420 K/uL 482 (H) 399   AST 10 - 38 U/L 31 31   ALT 13 - 56 U/L 48 44   Alk Phos 45 - 117 U/L 201 (H) 159 (H)   Bili, Total 0.2 - 1.0 MG/DL 0.3 0.2   Bili, Direct 0.0 - 0.2 MG/DL <0.1 <0.1   Albumin 3.4 - 5.0 g/dL 3.9 4.0   BUN 7.0 - 18 MG/DL 10 15   Creat 0.6 - 1.3 MG/DL 0.49 (L) 0.62   Na 136 - 145 mmol/L 139 140   K 3.5 - 5.5 mmol/L 4.1 4.3   Cl 100 - 111 mmol/L 106 107   CO2 21 - 32 mmol/L 25 30   Glucose 74 - 99 mg/dL 74 114 (H)     SEROLOGIES:  Serologies Latest Ref Rng & Units 7/31/2019   Hep A Ab, Total NEGATIVE   NEGATIVE   Hep B Surface Ag <1.00 Index <0.10   Hep B Surface Ag Interp NEG   NEGATIVE   Hep B Core Ab, Total NEGATIVE   NEGATIVE   Hep B Surface Ab >10.0 mIU/mL 21.14   Hep B Surface Ab Interp POS   POSITIVE   Hep C Ab 0.0 - 0.9 s/co ratio <0.1   Ferritin 8 - 388 NG/ML 23   Iron % Saturation % 14   JOSAFAT, IFA  NEGATIVE   C-ANCA Neg:<1:20 titer <1:20   P-ANCA Neg:<1:20 titer <1:20   ANCA Neg:<1:20 titer <1:20   ASMCA 0 - 19 Units 15   M2 Ab 0.0 - 20.0 Units 170.7 (H)   Alpha-1 antitrypsin level 90 - 200 mg/dL 125     LIVER HISTOLOGY:  8/2019. Shear wave elastography performed at THE [facility]. Mean liver stiffness value = 1.70 (+/- 0.05) m/s. The results suggested a fibrosis level of F2.    12/2019. FibroScan performed at The [facility]. EkPa was 10.9. IQR/med 15%. . The results suggested a fibrosis level of F2. The CAP score suggests no evidence of fatty liver. 9/2020. Shear wave elastography performed at THE [facility]. EkPa was E Range: 10.4-12.7 kPa, E Mean: 11.4 kPa, E Median: 11.2 kPa, E Std: 0.8 kPa. The results suggested a fibrosis level of F3-4.    10/2022. Fib[facility]Scan performed at [facility]. EkPa was 6.4. IQR/med 16%. . The results suggested a fibrosis level of F1. The CAP score suggests there is no significant hepatic steatosis. ENDOSCOPIC PROCEDURES:  Not available or performed    RADIOLOGY:  9/2020. Ultrasound of liver. Echogenic consistent with cirrhosis. No liver mass lesions. No dilated bile ducts. No ascites. 3/2021. Ultrasound of liver. Echogenic consistent with chronic liver disease. No liver mass lesions. No dilated bile ducts. No ascites. OTHER TESTING:  Not available or performed    FOLLOW-UP:  All of the issues listed above in the Assessment and Plan were discussed with the patient. All questions were answered. The patient expressed a clear understanding of the above. [facility] [address] in 6 months for routine monitoring.        Isiah Flores, AGPCNP-BC  [address] [facility] Liver [place] of [address], [facility] [address]  [place], [address] [place] [address] Street [address]  [phone]

## 2022-10-14 LAB
AFP L3 MFR SERPL: NORMAL % (ref 0–9.9)
AFP SERPL-MCNC: 1.9 NG/ML (ref 0–9.2)

## 2022-11-14 ENCOUNTER — HOSPITAL ENCOUNTER (OUTPATIENT)
Dept: ULTRASOUND IMAGING | Age: 57
Discharge: HOME | End: 2022-11-14
Attending: NURSE PRACTITIONER
Payer: OTHER GOVERNMENT

## 2022-11-14 DIAGNOSIS — K74.3 PRIMARY BILIARY CHOLANGITIS (HCC): ICD-10-CM

## 2022-11-14 PROCEDURE — 76705 ECHO EXAM OF ABDOMEN: CPT

## 2022-11-28 RX ORDER — URSODIOL 500 MG/1
TABLET, FILM COATED ORAL
Qty: 180 TABLET | Refills: 3 | Status: SHIPPED | OUTPATIENT
Start: 2022-11-28

## 2023-04-17 ENCOUNTER — OFFICE VISIT (OUTPATIENT)
Age: 58
End: 2023-04-17
Payer: OTHER GOVERNMENT

## 2023-04-17 ENCOUNTER — HOSPITAL ENCOUNTER (OUTPATIENT)
Facility: HOSPITAL | Age: 58
Setting detail: SPECIMEN
Discharge: HOME | End: 2023-04-20
Payer: OTHER GOVERNMENT

## 2023-04-17 VITALS
HEART RATE: 74 BPM | HEIGHT: 63 IN | BODY MASS INDEX: 25.16 KG/M2 | SYSTOLIC BLOOD PRESSURE: 112 MMHG | OXYGEN SATURATION: 98 % | TEMPERATURE: 97.3 F | WEIGHT: 142 LBS | DIASTOLIC BLOOD PRESSURE: 77 MMHG

## 2023-04-17 DIAGNOSIS — K74.3 PRIMARY BILIARY CHOLANGITIS (HCC): Primary | ICD-10-CM

## 2023-04-17 DIAGNOSIS — K74.3 PRIMARY BILIARY CHOLANGITIS (HCC): ICD-10-CM

## 2023-04-17 LAB
ALBUMIN SERPL-MCNC: 3.8 G/DL (ref 3.4–5)
ALBUMIN/GLOB SERPL: 1.2 (ref 0.8–1.7)
ALP SERPL-CCNC: 203 U/L (ref 45–117)
ALT SERPL-CCNC: 53 U/L (ref 13–56)
ANION GAP SERPL CALC-SCNC: 3 MMOL/L (ref 3–18)
AST SERPL-CCNC: 34 U/L (ref 10–38)
BASOPHILS # BLD: 0 K/UL (ref 0–0.1)
BASOPHILS NFR BLD: 0 % (ref 0–2)
BILIRUB DIRECT SERPL-MCNC: <0.1 MG/DL (ref 0–0.2)
BILIRUB SERPL-MCNC: 0.2 MG/DL (ref 0.2–1)
BUN SERPL-MCNC: 14 MG/DL (ref 7–18)
BUN/CREAT SERPL: 25 (ref 12–20)
CALCIUM SERPL-MCNC: 9.9 MG/DL (ref 8.5–10.1)
CHLORIDE SERPL-SCNC: 106 MMOL/L (ref 100–111)
CO2 SERPL-SCNC: 29 MMOL/L (ref 21–32)
CREAT SERPL-MCNC: 0.56 MG/DL (ref 0.6–1.3)
DIFFERENTIAL METHOD BLD: ABNORMAL
EOSINOPHIL # BLD: 0.1 K/UL (ref 0–0.4)
EOSINOPHIL NFR BLD: 1 % (ref 0–5)
ERYTHROCYTE [DISTWIDTH] IN BLOOD BY AUTOMATED COUNT: 13.7 % (ref 11.6–14.5)
GLOBULIN SER CALC-MCNC: 3.3 G/DL (ref 2–4)
GLUCOSE SERPL-MCNC: 91 MG/DL (ref 74–99)
HCT VFR BLD AUTO: 35.2 % (ref 35–45)
HGB BLD-MCNC: 11.4 G/DL (ref 12–16)
IMM GRANULOCYTES # BLD AUTO: 0.1 K/UL (ref 0–0.04)
IMM GRANULOCYTES NFR BLD AUTO: 1 % (ref 0–0.5)
LYMPHOCYTES # BLD: 1.8 K/UL (ref 0.9–3.6)
LYMPHOCYTES NFR BLD: 33 % (ref 21–52)
MCH RBC QN AUTO: 30.9 PG (ref 24–34)
MCHC RBC AUTO-ENTMCNC: 32.4 G/DL (ref 31–37)
MCV RBC AUTO: 95.4 FL (ref 78–100)
MONOCYTES # BLD: 0.8 K/UL (ref 0.05–1.2)
MONOCYTES NFR BLD: 14 % (ref 3–10)
NEUTS SEG # BLD: 2.8 K/UL (ref 1.8–8)
NEUTS SEG NFR BLD: 51 % (ref 40–73)
NRBC # BLD: 0 K/UL (ref 0–0.01)
NRBC BLD-RTO: 0 PER 100 WBC
PLATELET # BLD AUTO: 318 K/UL (ref 135–420)
PMV BLD AUTO: 10.2 FL (ref 9.2–11.8)
POTASSIUM SERPL-SCNC: 4.1 MMOL/L (ref 3.5–5.5)
PROT SERPL-MCNC: 7.1 G/DL (ref 6.4–8.2)
RBC # BLD AUTO: 3.69 M/UL (ref 4.2–5.3)
SODIUM SERPL-SCNC: 138 MMOL/L (ref 136–145)
WBC # BLD AUTO: 5.4 K/UL (ref 4.6–13.2)

## 2023-04-17 PROCEDURE — 82107 ALPHA-FETOPROTEIN L3: CPT

## 2023-04-17 PROCEDURE — 85025 COMPLETE CBC W/AUTO DIFF WBC: CPT

## 2023-04-17 PROCEDURE — 80048 BASIC METABOLIC PNL TOTAL CA: CPT

## 2023-04-17 PROCEDURE — 99214 OFFICE O/P EST MOD 30 MIN: CPT | Performed by: NURSE PRACTITIONER

## 2023-04-17 PROCEDURE — 80076 HEPATIC FUNCTION PANEL: CPT

## 2023-04-17 PROCEDURE — 36415 COLL VENOUS BLD VENIPUNCTURE: CPT

## 2023-04-17 ASSESSMENT — PATIENT HEALTH QUESTIONNAIRE - PHQ9
SUM OF ALL RESPONSES TO PHQ QUESTIONS 1-9: 0
2. FEELING DOWN, DEPRESSED OR HOPELESS: 0
SUM OF ALL RESPONSES TO PHQ QUESTIONS 1-9: 0
SUM OF ALL RESPONSES TO PHQ9 QUESTIONS 1 & 2: 0
SUM OF ALL RESPONSES TO PHQ QUESTIONS 1-9: 0
SUM OF ALL RESPONSES TO PHQ QUESTIONS 1-9: 0
1. LITTLE INTEREST OR PLEASURE IN DOING THINGS: 0

## 2023-04-19 LAB
AFP L3 MFR SERPL: NORMAL % (ref 0–9.9)
AFP SERPL-MCNC: 2.2 NG/ML (ref 0–9.2)

## 2023-05-01 ENCOUNTER — HOSPITAL ENCOUNTER (OUTPATIENT)
Facility: HOSPITAL | Age: 58
Discharge: HOME | End: 2023-05-04
Payer: OTHER GOVERNMENT

## 2023-05-01 DIAGNOSIS — K74.3 PRIMARY BILIARY CHOLANGITIS (HCC): ICD-10-CM

## 2023-05-01 PROCEDURE — 93975 VASCULAR STUDY: CPT

## 2023-10-09 ENCOUNTER — OFFICE VISIT (OUTPATIENT)
Age: 58
End: 2023-10-09
Payer: OTHER GOVERNMENT

## 2023-10-09 ENCOUNTER — HOSPITAL ENCOUNTER (OUTPATIENT)
Facility: HOSPITAL | Age: 58
Setting detail: SPECIMEN
Discharge: HOME | End: 2023-10-12
Payer: OTHER GOVERNMENT

## 2023-10-09 VITALS
HEART RATE: 69 BPM | SYSTOLIC BLOOD PRESSURE: 117 MMHG | WEIGHT: 147 LBS | BODY MASS INDEX: 26.05 KG/M2 | HEIGHT: 63 IN | OXYGEN SATURATION: 98 % | DIASTOLIC BLOOD PRESSURE: 70 MMHG | TEMPERATURE: 98.3 F

## 2023-10-09 DIAGNOSIS — K74.3 PRIMARY BILIARY CHOLANGITIS (HCC): Primary | ICD-10-CM

## 2023-10-09 DIAGNOSIS — K74.3 PRIMARY BILIARY CHOLANGITIS (HCC): ICD-10-CM

## 2023-10-09 LAB
ALBUMIN SERPL-MCNC: 4 G/DL (ref 3.4–5)
ALBUMIN/GLOB SERPL: 1.3 (ref 0.8–1.7)
ALP SERPL-CCNC: 210 U/L (ref 45–117)
ALT SERPL-CCNC: 77 U/L (ref 13–56)
ANION GAP SERPL CALC-SCNC: 4 MMOL/L (ref 3–18)
AST SERPL-CCNC: 43 U/L (ref 10–38)
BASOPHILS # BLD: 0 K/UL (ref 0–0.1)
BASOPHILS NFR BLD: 0 % (ref 0–2)
BILIRUB DIRECT SERPL-MCNC: <0.1 MG/DL (ref 0–0.2)
BILIRUB SERPL-MCNC: 0.3 MG/DL (ref 0.2–1)
BUN SERPL-MCNC: 13 MG/DL (ref 7–18)
BUN/CREAT SERPL: 23 (ref 12–20)
CALCIUM SERPL-MCNC: 9.8 MG/DL (ref 8.5–10.1)
CHLORIDE SERPL-SCNC: 108 MMOL/L (ref 100–111)
CO2 SERPL-SCNC: 28 MMOL/L (ref 21–32)
CREAT SERPL-MCNC: 0.56 MG/DL (ref 0.6–1.3)
DIFFERENTIAL METHOD BLD: ABNORMAL
EOSINOPHIL # BLD: 0.1 K/UL (ref 0–0.4)
EOSINOPHIL NFR BLD: 1 % (ref 0–5)
ERYTHROCYTE [DISTWIDTH] IN BLOOD BY AUTOMATED COUNT: 13.2 % (ref 11.6–14.5)
GLOBULIN SER CALC-MCNC: 3.2 G/DL (ref 2–4)
GLUCOSE SERPL-MCNC: 83 MG/DL (ref 74–99)
HCT VFR BLD AUTO: 37.1 % (ref 35–45)
HGB BLD-MCNC: 12 G/DL (ref 12–16)
IMM GRANULOCYTES # BLD AUTO: 0.1 K/UL (ref 0–0.04)
IMM GRANULOCYTES NFR BLD AUTO: 2 % (ref 0–0.5)
LYMPHOCYTES # BLD: 1.8 K/UL (ref 0.9–3.6)
LYMPHOCYTES NFR BLD: 30 % (ref 21–52)
MCH RBC QN AUTO: 30.9 PG (ref 24–34)
MCHC RBC AUTO-ENTMCNC: 32.3 G/DL (ref 31–37)
MCV RBC AUTO: 95.6 FL (ref 78–100)
MONOCYTES # BLD: 0.7 K/UL (ref 0.05–1.2)
MONOCYTES NFR BLD: 12 % (ref 3–10)
NEUTS SEG # BLD: 3.1 K/UL (ref 1.8–8)
NEUTS SEG NFR BLD: 54 % (ref 40–73)
NRBC # BLD: 0 K/UL (ref 0–0.01)
NRBC BLD-RTO: 0 PER 100 WBC
PLATELET # BLD AUTO: 330 K/UL (ref 135–420)
PMV BLD AUTO: 10.5 FL (ref 9.2–11.8)
POTASSIUM SERPL-SCNC: 4.3 MMOL/L (ref 3.5–5.5)
PROT SERPL-MCNC: 7.2 G/DL (ref 6.4–8.2)
RBC # BLD AUTO: 3.88 M/UL (ref 4.2–5.3)
SODIUM SERPL-SCNC: 140 MMOL/L (ref 136–145)
WBC # BLD AUTO: 5.8 K/UL (ref 4.6–13.2)

## 2023-10-09 PROCEDURE — 36415 COLL VENOUS BLD VENIPUNCTURE: CPT

## 2023-10-09 PROCEDURE — 85025 COMPLETE CBC W/AUTO DIFF WBC: CPT

## 2023-10-09 PROCEDURE — 80048 BASIC METABOLIC PNL TOTAL CA: CPT

## 2023-10-09 PROCEDURE — 80076 HEPATIC FUNCTION PANEL: CPT

## 2023-10-09 PROCEDURE — 99213 OFFICE O/P EST LOW 20 MIN: CPT | Performed by: NURSE PRACTITIONER

## 2023-10-09 NOTE — PROGRESS NOTES
MD Keshawn, [place], [place]      JENNYFER Kathleen S Shayne, [facility]   Sammie Cortez, [facility]   Lavonne aPtel, SÁNCHEZ Moran, SÁNCHEZ Morris, PCNP-BC      [address]   at [facility]   [address] [facility], [address] [facility], [address]   [phone]   FAX: [address]   at [facility], [address], [address]   [phone]   FAX: [phone]       Patient Care Team:  Kerwin Ricardo MD as PCP - General      Patient Active Problem List   Diagnosis    Primary biliary cholangitis [facility])    Maria Victoria Sheldon Si returns to the [address] [place] [address],[address] [facility] for management of Primary Biliary Cholangitis. The active problem list, all pertinent past medical history, medications, liver histology, radiologic findings, and laboratory findings related to the liver disorder were reviewed with the patient. The patient is a 62 y.o. female who was found to have elevated liver transaminases and alkaline phosphatase in 2009. Serologic evaluation for markers of chronic liver disease was positive for AMA. The most recent imaging of the liver was Ultrasound performed in 11/2022. Results suggest chronic liver disease. Contour is notable for slight lobulation but there is no definite nodularity. Assessment of liver fibrosis with Fibroscan was performed in 10/2022. The result was 6.4 kPa which correlates with stage 1 fibrosis. The CAP score of 246 does not suggest hepatic steatosis.     The patient has the following symptoms which are thought to be due to the liver disease: fatigue    The patient has not experienced the following symptoms: pain in the right side over the liver, itching    The patient completes all daily activities

## 2023-11-16 RX ORDER — URSODIOL 500 MG/1
TABLET, FILM COATED ORAL
Qty: 180 TABLET | Refills: 3 | Status: SHIPPED | OUTPATIENT
Start: 2023-11-16

## 2024-07-01 RX ORDER — MULTIVIT-MIN/IRON/FOLIC ACID/K 18-600-40
1000 CAPSULE ORAL DAILY
COMMUNITY
Start: 2024-01-10 | End: 2025-01-09

## 2024-07-02 ENCOUNTER — HOSPITAL ENCOUNTER (OUTPATIENT)
Facility: HOSPITAL | Age: 59
Setting detail: SPECIMEN
Discharge: HOME | End: 2024-07-05
Payer: OTHER GOVERNMENT

## 2024-07-02 ENCOUNTER — OFFICE VISIT (OUTPATIENT)
Age: 59
End: 2024-07-02
Payer: OTHER GOVERNMENT

## 2024-07-02 VITALS
HEART RATE: 66 BPM | DIASTOLIC BLOOD PRESSURE: 67 MMHG | OXYGEN SATURATION: 99 % | HEIGHT: 63 IN | TEMPERATURE: 98.4 F | SYSTOLIC BLOOD PRESSURE: 117 MMHG | WEIGHT: 142 LBS | BODY MASS INDEX: 25.16 KG/M2

## 2024-07-02 DIAGNOSIS — K74.3 PRIMARY BILIARY CHOLANGITIS (HCC): ICD-10-CM

## 2024-07-02 DIAGNOSIS — K90.0 CELIAC DISEASE: Primary | ICD-10-CM

## 2024-07-02 LAB
ALBUMIN SERPL-MCNC: 4.2 G/DL (ref 3.4–5)
ALBUMIN/GLOB SERPL: 1.4 (ref 0.8–1.7)
ALP SERPL-CCNC: 150 U/L (ref 45–117)
ALT SERPL-CCNC: 58 U/L (ref 13–56)
ANION GAP SERPL CALC-SCNC: 6 MMOL/L (ref 3–18)
AST SERPL-CCNC: 31 U/L (ref 10–38)
BASOPHILS # BLD: 0 K/UL (ref 0–0.1)
BASOPHILS NFR BLD: 1 % (ref 0–2)
BILIRUB DIRECT SERPL-MCNC: 0.1 MG/DL (ref 0–0.2)
BILIRUB SERPL-MCNC: 0.3 MG/DL (ref 0.2–1)
BUN SERPL-MCNC: 18 MG/DL (ref 7–18)
BUN/CREAT SERPL: 29 (ref 12–20)
CALCIUM SERPL-MCNC: 10.5 MG/DL (ref 8.5–10.1)
CHLORIDE SERPL-SCNC: 105 MMOL/L (ref 100–111)
CO2 SERPL-SCNC: 29 MMOL/L (ref 21–32)
CREAT SERPL-MCNC: 0.63 MG/DL (ref 0.6–1.3)
DIFFERENTIAL METHOD BLD: ABNORMAL
EOSINOPHIL # BLD: 0.1 K/UL (ref 0–0.4)
EOSINOPHIL NFR BLD: 2 % (ref 0–5)
ERYTHROCYTE [DISTWIDTH] IN BLOOD BY AUTOMATED COUNT: 13.1 % (ref 11.6–14.5)
GLOBULIN SER CALC-MCNC: 3.1 G/DL (ref 2–4)
GLUCOSE SERPL-MCNC: 83 MG/DL (ref 74–99)
HCT VFR BLD AUTO: 35.3 % (ref 35–45)
HGB BLD-MCNC: 11.7 G/DL (ref 12–16)
IMM GRANULOCYTES # BLD AUTO: 0.1 K/UL (ref 0–0.04)
IMM GRANULOCYTES NFR BLD AUTO: 1 % (ref 0–0.5)
LYMPHOCYTES # BLD: 2.2 K/UL (ref 0.9–3.6)
LYMPHOCYTES NFR BLD: 34 % (ref 21–52)
MCH RBC QN AUTO: 31.7 PG (ref 24–34)
MCHC RBC AUTO-ENTMCNC: 33.1 G/DL (ref 31–37)
MCV RBC AUTO: 95.7 FL (ref 78–100)
MONOCYTES # BLD: 0.9 K/UL (ref 0.05–1.2)
MONOCYTES NFR BLD: 13 % (ref 3–10)
NEUTS SEG # BLD: 3.3 K/UL (ref 1.8–8)
NEUTS SEG NFR BLD: 51 % (ref 40–73)
NRBC # BLD: 0 K/UL (ref 0–0.01)
NRBC BLD-RTO: 0 PER 100 WBC
PLATELET # BLD AUTO: 288 K/UL (ref 135–420)
PMV BLD AUTO: 10.4 FL (ref 9.2–11.8)
POTASSIUM SERPL-SCNC: 3.8 MMOL/L (ref 3.5–5.5)
PROT SERPL-MCNC: 7.3 G/DL (ref 6.4–8.2)
RBC # BLD AUTO: 3.69 M/UL (ref 4.2–5.3)
SODIUM SERPL-SCNC: 140 MMOL/L (ref 136–145)
WBC # BLD AUTO: 6.6 K/UL (ref 4.6–13.2)

## 2024-07-02 PROCEDURE — 36415 COLL VENOUS BLD VENIPUNCTURE: CPT

## 2024-07-02 PROCEDURE — 99214 OFFICE O/P EST MOD 30 MIN: CPT | Performed by: NURSE PRACTITIONER

## 2024-07-02 PROCEDURE — 85025 COMPLETE CBC W/AUTO DIFF WBC: CPT

## 2024-07-02 PROCEDURE — 80048 BASIC METABOLIC PNL TOTAL CA: CPT

## 2024-07-02 PROCEDURE — 80076 HEPATIC FUNCTION PANEL: CPT

## 2024-07-02 PROCEDURE — 91200 LIVER ELASTOGRAPHY: CPT | Performed by: NURSE PRACTITIONER

## 2024-07-02 NOTE — PROGRESS NOTES
[facility]      Glenn Worrell MD, FACP, FACG, FAASLD      JENNYFER [place], [facility]   Connie Kenney, [facility]   Na Ravi, FNP-C  Brian Morton, FNP-C   Sarah Martinez, [facility]   [address], [address]   [place]  [address] [phone]   FAX: [phone]  [facility]   [address], [address]   [place]  [address] [phone]   FAX: [phone]     Patient Care Team:  Mai Marin MD as PCP - General    Patient Active Problem List   Diagnosis    Primary biliary cholangitis (HCC)    Osteoporosis    Celiac disease     Prashant Tapia returns to the Liver [facility] for management of Primary Biliary Cholangitis. The active problem list, all pertinent past medical history, medications, liver histology, radiologic findings, and laboratory findings related to the liver disorder were reviewed with the patient.      The patient is a 58 y.o. female who was found to have elevated liver transaminases and alkaline phosphatase in 2009.      Serologic evaluation for markers of chronic liver disease was positive for AMA.    The most recent imaging of the liver was Ultrasound performed in 11/2022. Results suggest chronic liver disease. Contour is notable for slight lobulation but there is no definite nodularity.    Assessment of liver fibrosis with Fibroscan was performed in the office today. The result was 5.3 kPa which correlates with no fibrosis. The CAP score of 211 does not suggest hepatic steatosis.     The patient has the following symptoms which are thought to be due to the liver disease: fatigue    The patient has not experienced the following symptoms: pain in the right side over the liver, itching    The patient completes all daily

## 2024-11-11 RX ORDER — URSODIOL 500 MG/1
TABLET, FILM COATED ORAL
Qty: 180 TABLET | Refills: 3 | Status: SHIPPED | OUTPATIENT
Start: 2024-11-11

## 2025-01-06 ENCOUNTER — HOSPITAL ENCOUNTER (OUTPATIENT)
Facility: HOSPITAL | Age: 60
Setting detail: SPECIMEN
Discharge: HOME | End: 2025-01-09
Payer: OTHER GOVERNMENT

## 2025-01-06 ENCOUNTER — OFFICE VISIT (OUTPATIENT)
Age: 60
End: 2025-01-06
Payer: OTHER GOVERNMENT

## 2025-01-06 VITALS
SYSTOLIC BLOOD PRESSURE: 130 MMHG | DIASTOLIC BLOOD PRESSURE: 78 MMHG | BODY MASS INDEX: 25.87 KG/M2 | WEIGHT: 146 LBS | TEMPERATURE: 98.2 F | HEIGHT: 63 IN | HEART RATE: 87 BPM | OXYGEN SATURATION: 99 %

## 2025-01-06 DIAGNOSIS — K74.3 PRIMARY BILIARY CHOLANGITIS (HCC): Primary | ICD-10-CM

## 2025-01-06 DIAGNOSIS — K74.3 PRIMARY BILIARY CHOLANGITIS (HCC): ICD-10-CM

## 2025-01-06 LAB
ALBUMIN SERPL-MCNC: 4.3 G/DL (ref 3.4–5)
ALBUMIN/GLOB SERPL: 1.4 (ref 0.8–1.7)
ALP SERPL-CCNC: 168 U/L (ref 45–117)
ALT SERPL-CCNC: 55 U/L (ref 13–56)
ANION GAP SERPL CALC-SCNC: 6 MMOL/L (ref 3–18)
AST SERPL-CCNC: 30 U/L (ref 10–38)
BASOPHILS # BLD: 0.1 K/UL (ref 0–0.1)
BASOPHILS NFR BLD: 1 % (ref 0–2)
BILIRUB DIRECT SERPL-MCNC: <0.1 MG/DL (ref 0–0.2)
BILIRUB SERPL-MCNC: 0.3 MG/DL (ref 0.2–1)
BUN SERPL-MCNC: 12 MG/DL (ref 7–18)
BUN/CREAT SERPL: 19 (ref 12–20)
CALCIUM SERPL-MCNC: 10.2 MG/DL (ref 8.5–10.1)
CHLORIDE SERPL-SCNC: 105 MMOL/L (ref 100–111)
CO2 SERPL-SCNC: 28 MMOL/L (ref 21–32)
CREAT SERPL-MCNC: 0.64 MG/DL (ref 0.6–1.3)
DIFFERENTIAL METHOD BLD: ABNORMAL
EOSINOPHIL # BLD: 0.1 K/UL (ref 0–0.4)
EOSINOPHIL NFR BLD: 1 % (ref 0–5)
ERYTHROCYTE [DISTWIDTH] IN BLOOD BY AUTOMATED COUNT: 13 % (ref 11.6–14.5)
GLOBULIN SER CALC-MCNC: 3 G/DL (ref 2–4)
GLUCOSE SERPL-MCNC: 99 MG/DL (ref 74–99)
HCT VFR BLD AUTO: 37 % (ref 35–45)
HGB BLD-MCNC: 12.2 G/DL (ref 12–16)
IMM GRANULOCYTES # BLD AUTO: 0.2 K/UL (ref 0–0.04)
IMM GRANULOCYTES NFR BLD AUTO: 2 % (ref 0–0.5)
LYMPHOCYTES # BLD: 2.9 K/UL (ref 0.9–3.6)
LYMPHOCYTES NFR BLD: 32 % (ref 21–52)
MCH RBC QN AUTO: 31.6 PG (ref 24–34)
MCHC RBC AUTO-ENTMCNC: 33 G/DL (ref 31–37)
MCV RBC AUTO: 95.9 FL (ref 78–100)
MONOCYTES # BLD: 1.1 K/UL (ref 0.05–1.2)
MONOCYTES NFR BLD: 12 % (ref 3–10)
NEUTS SEG # BLD: 4.7 K/UL (ref 1.8–8)
NEUTS SEG NFR BLD: 51 % (ref 40–73)
NRBC # BLD: 0 K/UL (ref 0–0.01)
NRBC BLD-RTO: 0 PER 100 WBC
PLATELET # BLD AUTO: 355 K/UL (ref 135–420)
PMV BLD AUTO: 10.3 FL (ref 9.2–11.8)
POTASSIUM SERPL-SCNC: 4.3 MMOL/L (ref 3.5–5.5)
PROT SERPL-MCNC: 7.3 G/DL (ref 6.4–8.2)
RBC # BLD AUTO: 3.86 M/UL (ref 4.2–5.3)
SODIUM SERPL-SCNC: 139 MMOL/L (ref 136–145)
WBC # BLD AUTO: 9.1 K/UL (ref 4.6–13.2)

## 2025-01-06 PROCEDURE — 80076 HEPATIC FUNCTION PANEL: CPT

## 2025-01-06 PROCEDURE — 36415 COLL VENOUS BLD VENIPUNCTURE: CPT

## 2025-01-06 PROCEDURE — 99214 OFFICE O/P EST MOD 30 MIN: CPT | Performed by: NURSE PRACTITIONER

## 2025-01-06 PROCEDURE — 80048 BASIC METABOLIC PNL TOTAL CA: CPT

## 2025-01-06 PROCEDURE — 85025 COMPLETE CBC W/AUTO DIFF WBC: CPT

## 2025-01-06 NOTE — PROGRESS NOTES
[facility]      Glenn Worrell MD, FACP, FACG, FAASLD      JENNYFER Mills, [facility]   Connie Kenney, [facility]   Na Ravi, FNP-C  Brian Morton, FNP-C   Sarah Martinez, [facility]   [address], [address]   [place]  [address] [phone]   FAX: [phone]  [facility]   [address], [address]   [place]  [address] [phone]   FAX: [phone]     Patient Care Team:  Mai Marin MD as PCP - General    Patient Active Problem List   Diagnosis    Primary biliary cholangitis (HCC)    Osteoporosis    Celiac disease     Prashant Tapia returns to the Liver [facility] for management of Primary Biliary Cholangitis. The active problem list, all pertinent past medical history, medications, liver histology, radiologic findings, and laboratory findings related to the liver disorder were reviewed with the patient.      The patient is a 59 y.o. female who was found to have elevated liver transaminases and alkaline phosphatase in 2009.      Serologic evaluation for markers of chronic liver disease was positive for AMA.    The most recent imaging of the liver was Ultrasound performed in 11/2022. Results suggest chronic liver disease. Contour is notable for slight lobulation but there is no definite nodularity.    Assessment of liver fibrosis with Fibroscan was performed in the office today. The result was 5.3 kPa which correlates with no fibrosis. The CAP score of 211 does not suggest hepatic steatosis.     The patient has the following symptoms which are thought to be due to the liver disease: fatigue    The patient has not experienced the following symptoms: pain in the right side over the liver, itching    The patient completes all daily

## 2025-01-15 RX ORDER — SELADELPAR LYSINE 10 MG/1
1 CAPSULE ORAL DAILY
Qty: 90 CAPSULE | Refills: 3 | Status: ACTIVE | OUTPATIENT
Start: 2025-01-15

## 2025-04-28 DIAGNOSIS — K74.3 PRIMARY BILIARY CHOLANGITIS (HCC): Primary | ICD-10-CM

## 2025-04-28 RX ORDER — SELADELPAR LYSINE 10 MG/1
1 CAPSULE ORAL DAILY
Qty: 90 CAPSULE | Refills: 3 | Status: ACTIVE | OUTPATIENT
Start: 2025-04-28

## 2025-07-08 ENCOUNTER — OFFICE VISIT (OUTPATIENT)
Age: 60
End: 2025-07-08
Payer: OTHER GOVERNMENT

## 2025-07-08 VITALS
OXYGEN SATURATION: 95 % | SYSTOLIC BLOOD PRESSURE: 103 MMHG | HEART RATE: 77 BPM | DIASTOLIC BLOOD PRESSURE: 64 MMHG | WEIGHT: 148.2 LBS | BODY MASS INDEX: 26.26 KG/M2 | HEIGHT: 63 IN | TEMPERATURE: 99 F

## 2025-07-08 DIAGNOSIS — K74.3 PRIMARY BILIARY CHOLANGITIS (HCC): Primary | ICD-10-CM

## 2025-07-08 LAB
ALBUMIN SERPL-MCNC: 4.5 G/DL (ref 3.5–5)
ALBUMIN/GLOB SERPL: 1.4 (ref 1.1–2.2)
ALP SERPL-CCNC: 94 U/L (ref 45–117)
ALT SERPL-CCNC: 54 U/L (ref 12–78)
ANION GAP SERPL CALC-SCNC: 7 MMOL/L (ref 2–12)
AST SERPL-CCNC: 33 U/L (ref 15–37)
BILIRUB SERPL-MCNC: 0.3 MG/DL (ref 0.2–1)
BUN SERPL-MCNC: 17 MG/DL (ref 6–20)
BUN/CREAT SERPL: 27 (ref 12–20)
CALCIUM SERPL-MCNC: 10.4 MG/DL (ref 8.5–10.1)
CHLORIDE SERPL-SCNC: 107 MMOL/L (ref 97–108)
CO2 SERPL-SCNC: 25 MMOL/L (ref 21–32)
CREAT SERPL-MCNC: 0.63 MG/DL (ref 0.55–1.02)
GLOBULIN SER CALC-MCNC: 3.2 G/DL (ref 2–4)
GLUCOSE SERPL-MCNC: 78 MG/DL (ref 65–100)
POTASSIUM SERPL-SCNC: 4.2 MMOL/L (ref 3.5–5.1)
PROT SERPL-MCNC: 7.7 G/DL (ref 6.4–8.2)
SODIUM SERPL-SCNC: 139 MMOL/L (ref 136–145)

## 2025-07-08 PROCEDURE — 99213 OFFICE O/P EST LOW 20 MIN: CPT | Performed by: INTERNAL MEDICINE

## 2025-07-08 ASSESSMENT — PATIENT HEALTH QUESTIONNAIRE - PHQ9
SUM OF ALL RESPONSES TO PHQ QUESTIONS 1-9: 0
SUM OF ALL RESPONSES TO PHQ QUESTIONS 1-9: 0
2. FEELING DOWN, DEPRESSED OR HOPELESS: NOT AT ALL
SUM OF ALL RESPONSES TO PHQ QUESTIONS 1-9: 0
DEPRESSION UNABLE TO ASSESS: FUNCTIONAL CAPACITY MOTIVATION LIMITS ACCURACY
1. LITTLE INTEREST OR PLEASURE IN DOING THINGS: NOT AT ALL
SUM OF ALL RESPONSES TO PHQ QUESTIONS 1-9: 0

## 2025-07-08 NOTE — PROGRESS NOTES
[facility]  [address], [address]  [place] [id number] [phone]  FAX: [phone]     Glenn Worrell MD, FACP, [facility], [facility]   MD Marnie Mosley, JENNYFER Bella, AGPCNP-BC   Connie Kenney, ACNPC-A    Liver Transplant and Liver Cancer Coordination:   PARDEEP Orr, PARDEEP Kent RN    Clinical Research Coordination:   Marnie Galan, PARDEEP Zaidi, PARDEEP Daniel, PARDEEP Clemons, PARDEEP       Patient Care Team:  Mai Mairn MD as PCP - General      Patient Active Problem List   Diagnosis    Primary biliary cholangitis (HCC)    Osteoporosis    Celiac disease       Prashant Tapia returns to the Liver [facility] for management of Primary Biliary Cholangitis.     The active problem list, all pertinent past medical history, medications, liver histology, radiologic findings, and laboratory findings related to the liver disorder were reviewed with the patient.      The patient is a 59 y.o. female who was found to have elevated liver transaminases and alkaline phosphatase in 2009.    Serologic evaluation for markers of chronic liver disease was positive for AMA.    The patient has been treated with CARLTON since 2019  Seladelpar was added in 4/2025 because the ALP remained elevation in the 150-168 range    In the office today the patient has the following symptoms:  The patient feels well and has no complaints.    The patient is not experiencing the following symptoms which are commonly seen in this liver disorder:   fatigue,   pain in the right side over the liver,     The patient completes all daily activities without any functional limitations.      ASSESSMENT AND PLAN:  Primary Biliary Cholangitis  The diagnosis is based upon serology and an elevation in ALP.    A liver biopsy has not been performed.    Fibroscan performed in 10/2022 demonstrates 5.3 pKa with

## 2025-07-08 NOTE — PROGRESS NOTES
Chief Complaint   Patient presents with    New Patient     New patient   Records are in EPIC     Vitals:    07/08/25 1032   BP: 103/64   BP Site: Left Upper Arm   Patient Position: Sitting   Pulse: 77   Temp: 99 °F (37.2 °C)   TempSrc: Temporal   SpO2: 95%   Weight: 67.2 kg (148 lb 3.2 oz)   Height: 1.6 m (5' 3\")     .  \"Have you been to the ER, urgent care clinic since your last visit?  Hospitalized since your last visit?\"    NO    “Have you seen or consulted any other health care providers outside of [facility] since your last visit?”    NO    Have you had a mammogram?”   NO    No breast cancer screening on file             Click Here for Release of Records Request

## 2025-07-16 ENCOUNTER — TELEPHONE (OUTPATIENT)
Age: 60
End: 2025-07-16

## 2025-07-16 NOTE — TELEPHONE ENCOUNTER
----- Message from Dr. Glenn Worrell MD sent at 7/16/2025  6:48 AM EDT -----  Regarding: All liver enzymes now normal since adding Seadelpar.  Continue both this and CARLTON.  Keep FU appt with Marnie in 6 months.  MLS      7/16/25@1046 Spoke w/patient. Above information relayed to patient. Patient taking both medication and will keep follow up appt 01/2026. (KF)